# Patient Record
Sex: FEMALE | Race: WHITE | URBAN - METROPOLITAN AREA
[De-identification: names, ages, dates, MRNs, and addresses within clinical notes are randomized per-mention and may not be internally consistent; named-entity substitution may affect disease eponyms.]

---

## 2018-05-11 ENCOUNTER — HOSPITAL ENCOUNTER (INPATIENT)
Facility: CLINIC | Age: 68
LOS: 4 days | Discharge: NURSING, REHAB OR LONG-TERM CARE | DRG: 885 | End: 2018-05-16
Attending: EMERGENCY MEDICINE | Admitting: PSYCHIATRY & NEUROLOGY
Payer: COMMERCIAL

## 2018-05-11 ENCOUNTER — APPOINTMENT (OUTPATIENT)
Dept: GENERAL RADIOLOGY | Facility: CLINIC | Age: 68
DRG: 885 | End: 2018-05-11
Attending: EMERGENCY MEDICINE
Payer: COMMERCIAL

## 2018-05-11 DIAGNOSIS — R44.3 HALLUCINATIONS: ICD-10-CM

## 2018-05-11 DIAGNOSIS — F25.9 SCHIZOAFFECTIVE DISORDER, UNSPECIFIED TYPE (H): ICD-10-CM

## 2018-05-11 LAB
ANION GAP SERPL CALCULATED.3IONS-SCNC: 12 MMOL/L (ref 3–14)
BASOPHILS # BLD AUTO: 0.1 10E9/L (ref 0–0.2)
BASOPHILS NFR BLD AUTO: 0.6 %
BUN SERPL-MCNC: 21 MG/DL (ref 7–30)
CALCIUM SERPL-MCNC: 9.4 MG/DL (ref 8.5–10.1)
CHLORIDE SERPL-SCNC: 104 MMOL/L (ref 94–109)
CO2 SERPL-SCNC: 28 MMOL/L (ref 20–32)
CREAT SERPL-MCNC: 0.64 MG/DL (ref 0.52–1.04)
DEPRECATED S PYO AG THROAT QL EIA: NORMAL
DIFFERENTIAL METHOD BLD: NORMAL
EOSINOPHIL # BLD AUTO: 0.4 10E9/L (ref 0–0.7)
EOSINOPHIL NFR BLD AUTO: 4.7 %
ERYTHROCYTE [DISTWIDTH] IN BLOOD BY AUTOMATED COUNT: 14.3 % (ref 10–15)
GFR SERPL CREATININE-BSD FRML MDRD: >90 ML/MIN/1.7M2
GLUCOSE SERPL-MCNC: 92 MG/DL (ref 70–99)
HCT VFR BLD AUTO: 38.1 % (ref 35–47)
HGB BLD-MCNC: 12.8 G/DL (ref 11.7–15.7)
IMM GRANULOCYTES # BLD: 0 10E9/L (ref 0–0.4)
IMM GRANULOCYTES NFR BLD: 0.3 %
LYMPHOCYTES # BLD AUTO: 3.7 10E9/L (ref 0.8–5.3)
LYMPHOCYTES NFR BLD AUTO: 41.5 %
MCH RBC QN AUTO: 30.1 PG (ref 26.5–33)
MCHC RBC AUTO-ENTMCNC: 33.6 G/DL (ref 31.5–36.5)
MCV RBC AUTO: 90 FL (ref 78–100)
MONOCYTES # BLD AUTO: 0.7 10E9/L (ref 0–1.3)
MONOCYTES NFR BLD AUTO: 8.1 %
NEUTROPHILS # BLD AUTO: 4 10E9/L (ref 1.6–8.3)
NEUTROPHILS NFR BLD AUTO: 44.8 %
NRBC # BLD AUTO: 0 10*3/UL
NRBC BLD AUTO-RTO: 0 /100
PLATELET # BLD AUTO: 226 10E9/L (ref 150–450)
POTASSIUM SERPL-SCNC: 4.1 MMOL/L (ref 3.4–5.3)
RBC # BLD AUTO: 4.25 10E12/L (ref 3.8–5.2)
SODIUM SERPL-SCNC: 144 MMOL/L (ref 133–144)
SPECIMEN SOURCE: NORMAL
TSH SERPL DL<=0.005 MIU/L-ACNC: 1.37 MU/L (ref 0.4–4)
WBC # BLD AUTO: 8.9 10E9/L (ref 4–11)

## 2018-05-11 PROCEDURE — 87880 STREP A ASSAY W/OPTIC: CPT | Performed by: EMERGENCY MEDICINE

## 2018-05-11 PROCEDURE — 71046 X-RAY EXAM CHEST 2 VIEWS: CPT

## 2018-05-11 PROCEDURE — 80048 BASIC METABOLIC PNL TOTAL CA: CPT | Performed by: EMERGENCY MEDICINE

## 2018-05-11 PROCEDURE — 99285 EMERGENCY DEPT VISIT HI MDM: CPT | Mod: 25 | Performed by: EMERGENCY MEDICINE

## 2018-05-11 PROCEDURE — 90791 PSYCH DIAGNOSTIC EVALUATION: CPT

## 2018-05-11 PROCEDURE — 84443 ASSAY THYROID STIM HORMONE: CPT | Performed by: EMERGENCY MEDICINE

## 2018-05-11 PROCEDURE — 87081 CULTURE SCREEN ONLY: CPT | Performed by: EMERGENCY MEDICINE

## 2018-05-11 PROCEDURE — 85025 COMPLETE CBC W/AUTO DIFF WBC: CPT | Performed by: EMERGENCY MEDICINE

## 2018-05-11 PROCEDURE — 99285 EMERGENCY DEPT VISIT HI MDM: CPT | Mod: Z6 | Performed by: EMERGENCY MEDICINE

## 2018-05-11 RX ORDER — ALBUTEROL SULFATE 90 UG/1
2 AEROSOL, METERED RESPIRATORY (INHALATION) EVERY 6 HOURS PRN
COMMUNITY

## 2018-05-11 RX ORDER — ALENDRONATE SODIUM 70 MG/1
70 TABLET ORAL
COMMUNITY

## 2018-05-11 RX ORDER — TRAZODONE HYDROCHLORIDE 100 MG/1
100 TABLET ORAL AT BEDTIME
COMMUNITY

## 2018-05-11 RX ORDER — ZIPRASIDONE HYDROCHLORIDE 80 MG/1
80 CAPSULE ORAL EVERY EVENING
Status: ON HOLD | COMMUNITY
End: 2018-05-16

## 2018-05-11 RX ORDER — IPRATROPIUM BROMIDE AND ALBUTEROL SULFATE 2.5; .5 MG/3ML; MG/3ML
1 SOLUTION RESPIRATORY (INHALATION) 3 TIMES DAILY
COMMUNITY

## 2018-05-11 RX ORDER — AZELASTINE 1 MG/ML
1 SPRAY, METERED NASAL 2 TIMES DAILY
COMMUNITY

## 2018-05-11 RX ORDER — LORAZEPAM 0.5 MG/1
0.5 TABLET ORAL 2 TIMES DAILY
COMMUNITY

## 2018-05-11 RX ORDER — CLINDAMYCIN HCL 300 MG
600 CAPSULE ORAL DAILY
Status: ON HOLD | COMMUNITY
End: 2018-05-16

## 2018-05-11 RX ORDER — BUDESONIDE 0.5 MG/2ML
0.5 INHALANT ORAL 2 TIMES DAILY
COMMUNITY

## 2018-05-11 RX ORDER — ANTACID TABLETS 500 MG/1
1 TABLET, CHEWABLE ORAL 2 TIMES DAILY
COMMUNITY

## 2018-05-11 RX ORDER — ESCITALOPRAM OXALATE 20 MG/1
30 TABLET ORAL DAILY
COMMUNITY

## 2018-05-11 RX ORDER — ACETAMINOPHEN 325 MG/1
650 TABLET ORAL 3 TIMES DAILY
COMMUNITY

## 2018-05-11 RX ORDER — LEVOTHYROXINE SODIUM 150 UG/1
150 TABLET ORAL DAILY
COMMUNITY

## 2018-05-11 RX ORDER — ONDANSETRON 4 MG/1
4 TABLET, FILM COATED ORAL EVERY 8 HOURS PRN
COMMUNITY

## 2018-05-11 RX ORDER — FLUTICASONE PROPIONATE 50 MCG
1 SPRAY, SUSPENSION (ML) NASAL DAILY
COMMUNITY

## 2018-05-11 RX ORDER — SODIUM FLUORIDE 5 MG/G
GEL, DENTIFRICE DENTAL DAILY
COMMUNITY

## 2018-05-11 RX ORDER — ARIPIPRAZOLE 5 MG/1
5 TABLET ORAL DAILY
Status: ON HOLD | COMMUNITY
End: 2018-05-16

## 2018-05-11 RX ORDER — CETIRIZINE HYDROCHLORIDE 10 MG/1
10 TABLET ORAL DAILY
COMMUNITY

## 2018-05-11 RX ORDER — ZIPRASIDONE HYDROCHLORIDE 60 MG/1
60 CAPSULE ORAL 2 TIMES DAILY WITH MEALS
COMMUNITY

## 2018-05-11 RX ORDER — AMOXICILLIN 250 MG
1 CAPSULE ORAL DAILY
Status: ON HOLD | COMMUNITY
End: 2018-05-16

## 2018-05-11 ASSESSMENT — ENCOUNTER SYMPTOMS
COUGH: 1
CHILLS: 0
SORE THROAT: 1
FEVER: 0

## 2018-05-11 ASSESSMENT — ACTIVITIES OF DAILY LIVING (ADL)
GROOMING: WITH ASSISTANCE
ORAL_HYGIENE: WITH ASSISTANCE
DRESS: WITH ASSISTANCE

## 2018-05-11 NOTE — IP AVS SNAPSHOT
STOP!!! DO NOT PRINT OR REFERENCE THIS AVS!!!  AVS displayed here is NOT the version that was given to the patient at discharge.  GO TO CHART REVIEW to print or review a copy of the AVS that was frozen/printed at time of discharge.                           MRN:[id number]                      After Visit Summary   5/11/2018    Ekaterina Waldrop    MRN: [id number]           Thank you!     Thank you for choosing [facility] for your care. Our goal is always to provide you with excellent care.        Patient Information     Date Of Birth          1950        Designated Caregiver       Most Recent Value    Caregiver    Will someone help with your care after discharge? yes    Name of designated caregiver Assited living    Phone number of caregiver unk    Caregiver address unk      About your hospital stay     You were admitted on:  May 12, 2018 You last received care in the:  [address]    You were discharged on:  May 16, 2018       Who to Call     For medical emergencies, please call 911.  For non-urgent questions about your medical care, please call your primary care provider or clinic, None          Attending Provider     Provider Specialty    Kathie Banerjee MD Emergency Medicine    Safia Crouch MD Psychiatry    [facility], Ronnie Garrett MD Psychiatry       Primary Care Provider Fax #    Physician No Ref-Primary [phone]      Further instructions from your care team        Behavioral Discharge Planning and Instructions      Summary:  You were admitted on 5/11/2018  due to Schizoaffective disorder.  You were treated by Dr. Ronnie Cristina MD and discharged on 05/16/2018 from [address] to [place] [address]  [phone]      Principal Diagnosis:   Schizoaffective Disorder    Health Care Follow-up Appointments:   Psychiatry:   Dr. Mark Watkins MD, -  Tuesday, May 29th at 3:20 pm  [facility], [place],   [phone]    PCP:   [address] [place] [address]  [place]  [address],   [phone], Fax: [phone]    :    Jewels Pennington,   [phone],   Fax: [phone]    Guardian:  Marie Roberson  [facility]   ([phone])    Attend all scheduled appointments with your outpatient providers. Call at least 24 hours in advance if you need to reschedule an appointment to ensure continued access to your outpatient providers.   Major Treatments, Procedures and Findings:  You were provided with: a psychiatric assessment, assessed for medical stability and medication evaluation and/or management    Symptoms to Report: feeling more aggressive, increased confusion, losing more sleep, mood getting worse or thoughts of suicide    Early warning signs can include: increased depression or anxiety sleep disturbances increased thoughts or behaviors of suicide or self-harm  increased unusual thinking, such as paranoia or hearing voices    Safety and Wellness:  Take all medicines as directed.  Make no changes unless your doctor suggests them.      Follow treatment recommendations.  Refrain from alcohol and non-prescribed drugs.  If there is a concern for safety, call 911.    Resources:   Crisis Intervention: 760.289.9985 or 955-069-7830 (TTY: 683.607.9455).  Call anytime for help.  [employer] [place] [employer]ness (www.[place].eleonora.org): 457.766.2648 or 293-840-2429.  Suicide Awareness Voices of Education (SAVE) (www.save.org): 707-880-SAVE (0989)  Mental Health Consumer/Survivor Network of [place] (www.mhcsn.net): 796.594.7118 or 255-818-9092  Mental Health Association of [place] (www.mentalhealth.org): 101.835.1519 or 086-626-8063  Self- Management and Recovery Training., SMART-- Toll free: [phone]  www.[place].org  [facility] Crisis (COPE) Response - Adult [phone]    The treatment team has appreciated the opportunity to work with you.     If you have any questions or concerns our unit number is [phone].        Pending  "Results     No orders found from 2018 to 2018.            Statement of Approval     Ordered          18 0856  I have reviewed and agree with all the recommendations and orders detailed in this document.  EFFECTIVE NOW     Approved and electronically signed by:  Ronnie Kaba MD             Admission Information     Date & Time Department Dept. Phone    2018 [facility] -[phone]      Your Vitals Were     Blood Pressure Pulse Temperature Respirations Height Weight    96/48 96 98.3  F (36.8  C) (Tympanic) 16 1.511 m (4' 11.5\") 73.1 kg (161 lb 1.6 oz)    Pulse Oximetry BMI (Body Mass Index)                93% 31.99 kg/m2          MyChart Information     [facility] lets you send messages to your doctor, view your test results, renew your prescriptions, schedule appointments and more. To sign up, go to [email or web address] . Click on \"Log in\" on the left side of the screen, which will take you to the Welcome page. Then click on \"Sign up Now\" on the right side of the page.     You will be asked to enter the access code listed below, as well as some personal information. Please follow the directions to create your username and password.     Your access code is: [email or web address]  Expires: 2018  8:31 AM     Your access code will  in 90 days. If you need help or a new code, please call your [place] clinic or [phone].        Care EveryWhere ID     This is your Care EveryWhere ID. This could be used by other organizations to access your [place] medical records  [id number]        Equal Access to Services     [facility] : [employer], waaxda luqcaryn, qaybdieter catryallora almaraz. So [facility] [phone].    ATENCIÓN: Si habla español, tiene a rodriguez disposición servicios gratuitos de asistencia lingüística. Llame al [phone].    We comply with applicable federal civil rights laws and [place] laws. We do not discriminate on " the basis of race, color, national origin, age, disability, sex, sexual orientation, or gender identity.               Review of your medicines      CONTINUE these medicines which may have CHANGED, or have new prescriptions. If we are uncertain of the size of tablets/capsules you have at home, strength may be listed as something that might have changed.        Dose / Directions    ARIPiprazole 10 MG tablet   Commonly known as:  ABILIFY   This may have changed:    - medication strength  - how much to take   Used for:  Schizoaffective disorder, unspecified type (H)        Dose:  10 mg   Take 1 tablet (10 mg) by mouth daily   Quantity:  30 tablet   Refills:  1       senna-docusate 8.6-50 MG per tablet   Commonly known as:  SENOKOT-S;PERICOLACE   This may have changed:  when to take this        Dose:  1 tablet   Take 1 tablet by mouth 2 times daily   Quantity:  100 tablet   Refills:  0       * ziprasidone 60 MG capsule   Commonly known as:  GEODON   This may have changed:  Another medication with the same name was changed. Make sure you understand how and when to take each.        Dose:  60 mg   Take 60 mg by mouth 2 times daily (with meals)   Refills:  0       * ziprasidone 40 MG capsule   Commonly known as:  GEODON   This may have changed:    - medication strength  - how much to take   Used for:  Schizoaffective disorder, unspecified type (H)        Dose:  40 mg   Take 1 capsule (40 mg) by mouth every evening   Quantity:  30 capsule   Refills:  1       * Notice:  This list has 2 medication(s) that are the same as other medications prescribed for you. Read the directions carefully, and ask your doctor or other care provider to review them with you.      CONTINUE these medicines which have NOT CHANGED        Dose / Directions    acetaminophen 325 MG tablet   Commonly known as:  TYLENOL        Dose:  650 mg   Take 650 mg by mouth 3 times daily and once daily as needed   Refills:  0       albuterol 108 (90 Base) MCG/ACT  Inhaler   Commonly known as:  PROAIR HFA/PROVENTIL HFA/VENTOLIN HFA        Dose:  2 puff   Inhale 2 puffs into the lungs every 6 hours as needed for shortness of breath / dyspnea or wheezing   Refills:  0       alendronate 70 MG tablet   Commonly known as:  FOSAMAX        Dose:  70 mg   Take 70 mg by mouth every 7 days   Refills:  0       azelastine 0.1 % spray   Commonly known as:  ASTELIN        Dose:  1 spray   Spray 1 spray into both nostrils 2 times daily   Refills:  0       bismuth subsalicylate 525 MG/15ML Susp        Dose:  15 mL   Take 15 mLs by mouth 3 times daily as needed   Refills:  0       budesonide 0.5 MG/2ML neb solution   Commonly known as:  PULMICORT        Dose:  0.5 mg   Take 0.5 mg by nebulization 2 times daily   Refills:  0       calcium 600 + D 600-400 MG-UNIT per tablet   Generic drug:  calcium-vitamin D        Dose:  1 tablet   Take 1 tablet by mouth 3 times daily   Refills:  0       calcium carbonate 500 MG Chew        Dose:  1 tablet   Take 1 tablet by mouth 2 times daily   Refills:  0       cetirizine 10 MG tablet   Commonly known as:  zyrTEC        Dose:  10 mg   Take 10 mg by mouth daily   Refills:  0       cholecalciferol 1000 units Tabs        Dose:  1000 Units   Take 1,000 Units by mouth daily   Refills:  0       escitalopram 20 MG tablet   Commonly known as:  LEXAPRO        Dose:  30 mg   Take 30 mg by mouth daily   Refills:  0       fluticasone 50 MCG/ACT spray   Commonly known as:  FLONASE        Dose:  1 spray   Spray 1 spray into both nostrils daily   Refills:  0       hypromellose-dextran 0.1-0.3 % Soln ophthalmic solution   Commonly known as:  ARTIFICAL TEARS        Dose:  1 drop   Place 1 drop into both eyes 2 times daily   Refills:  0       ipratropium - albuterol 0.5 mg/2.5 mg/3 mL 0.5-2.5 (3) MG/3ML neb solution   Commonly known as:  DUONEB        Dose:  1 vial   Take 1 vial by nebulization 3 times daily   Refills:  0       levothyroxine 150 MCG tablet   Commonly known  as:  SYNTHROID/LEVOTHROID        Dose:  150 mcg   Take 150 mcg by mouth daily   Refills:  0       LORazepam 0.5 MG tablet   Commonly known as:  ATIVAN        Dose:  0.5 mg   Take 0.5 mg by mouth 2 times daily   Refills:  0       MULTIVITAMIN ADULT PO        Dose:  1 tablet   Take 1 tablet by mouth daily   Refills:  0       ondansetron 4 MG tablet   Commonly known as:  ZOFRAN        Dose:  4 mg   Take 4 mg by mouth every 8 hours as needed for nausea   Refills:  0       POLYETHYLENE GLYCOL 3350 PO        Dose:  1 packet   Take 1 packet by mouth daily   Refills:  0       sodium fluoride dental gel 1.1 % Gel topical gel   Commonly known as:  PREVIDENT        Apply to affected area daily   Refills:  0       traZODone 100 MG tablet   Commonly known as:  DESYREL        Dose:  100 mg   Take 100 mg by mouth At Bedtime   Refills:  0         STOP taking     clindamycin 300 MG capsule   Commonly known as:  CLEOCIN                Where to get your medicines      These medications were sent to [facility] - [place], [place] - [address]  [address], [facility] [address]     Phone:  [phone]     ARIPiprazole 10 MG tablet    ziprasidone 40 MG capsule                Protect others around you: Learn how to safely use, store and throw away your medicines at www.disposemymeds.org.             Medication List: This is a list of all your medications and when to take them. Check marks below indicate your daily home schedule. Keep this list as a reference.      Medications           Morning Afternoon Evening Bedtime As Needed    acetaminophen 325 MG tablet   Commonly known as:  TYLENOL   Take 650 mg by mouth 3 times daily and once daily as needed                                albuterol 108 (90 Base) MCG/ACT Inhaler   Commonly known as:  PROAIR HFA/PROVENTIL HFA/VENTOLIN HFA   Inhale 2 puffs into the lungs every 6 hours as needed for shortness of breath / dyspnea or wheezing                                 alendronate 70 MG tablet   Commonly known as:  FOSAMAX   Take 70 mg by mouth every 7 days                                ARIPiprazole 10 MG tablet   Commonly known as:  ABILIFY   Take 1 tablet (10 mg) by mouth daily   Last time this was given:  10 mg on 5/16/2018  8:28 AM                                azelastine 0.1 % spray   Commonly known as:  ASTELIN   Spray 1 spray into both nostrils 2 times daily   Last time this was given:  1 spray on 5/16/2018  8:40 AM                                bismuth subsalicylate 525 MG/15ML Susp   Take 15 mLs by mouth 3 times daily as needed                                budesonide 0.5 MG/2ML neb solution   Commonly known as:  PULMICORT   Take 0.5 mg by nebulization 2 times daily   Last time this was given:  0.5 mg on 5/16/2018  9:24 AM                                calcium 600 + D 600-400 MG-UNIT per tablet   Take 1 tablet by mouth 3 times daily   Generic drug:  calcium-vitamin D                                calcium carbonate 500 MG Chew   Take 1 tablet by mouth 2 times daily                                cetirizine 10 MG tablet   Commonly known as:  zyrTEC   Take 10 mg by mouth daily   Last time this was given:  10 mg on 5/16/2018  8:35 AM                                cholecalciferol 1000 units Tabs   Take 1,000 Units by mouth daily                                escitalopram 20 MG tablet   Commonly known as:  LEXAPRO   Take 30 mg by mouth daily   Last time this was given:  30 mg on 5/16/2018  8:35 AM                                fluticasone 50 MCG/ACT spray   Commonly known as:  FLONASE   Spray 1 spray into both nostrils daily   Last time this was given:  1 spray on 5/16/2018  8:39 AM                                hypromellose-dextran 0.1-0.3 % Soln ophthalmic solution   Commonly known as:  ARTIFICAL TEARS   Place 1 drop into both eyes 2 times daily   Last time this was given:  2 drops on 5/16/2018  8:41 AM                                ipratropium -  albuterol 0.5 mg/2.5 mg/3 mL 0.5-2.5 (3) MG/3ML neb solution   Commonly known as:  DUONEB   Take 1 vial by nebulization 3 times daily   Last time this was given:  3 mLs on 5/16/2018  9:25 AM                                levothyroxine 150 MCG tablet   Commonly known as:  SYNTHROID/LEVOTHROID   Take 150 mcg by mouth daily   Last time this was given:  150 mcg on 5/16/2018  8:34 AM                                LORazepam 0.5 MG tablet   Commonly known as:  ATIVAN   Take 0.5 mg by mouth 2 times daily   Last time this was given:  0.5 mg on 5/16/2018  8:35 AM                                MULTIVITAMIN ADULT PO   Take 1 tablet by mouth daily                                ondansetron 4 MG tablet   Commonly known as:  ZOFRAN   Take 4 mg by mouth every 8 hours as needed for nausea                                POLYETHYLENE GLYCOL 3350 PO   Take 1 packet by mouth daily   Last time this was given:  17 g on 5/16/2018  8:36 AM                                senna-docusate 8.6-50 MG per tablet   Commonly known as:  SENOKOT-S;PERICOLACE   Take 1 tablet by mouth 2 times daily   Last time this was given:  1 tablet on 5/16/2018  8:35 AM                                sodium fluoride dental gel 1.1 % Gel topical gel   Commonly known as:  PREVIDENT   Apply to affected area daily                                traZODone 100 MG tablet   Commonly known as:  DESYREL   Take 100 mg by mouth At Bedtime   Last time this was given:  100 mg on 5/15/2018  9:00 PM                                * ziprasidone 60 MG capsule   Commonly known as:  GEODON   Take 60 mg by mouth 2 times daily (with meals)   Last time this was given:  60 mg on 5/16/2018  8:35 AM                                * ziprasidone 40 MG capsule   Commonly known as:  GEODON   Take 1 capsule (40 mg) by mouth every evening   Last time this was given:  60 mg on 5/16/2018  8:35 AM                                * Notice:  This list has 2 medication(s) that are the same as other  medications prescribed for you. Read the directions carefully, and ask your doctor or other care provider to review them with you.

## 2018-05-11 NOTE — IP AVS SNAPSHOT
[facility]    [address]    [place] [address]    Phone:  [phone]                                       After Visit Summary   5/11/2018    Ekaterina Waldrop    MRN: [id number]           After Visit Summary Signature Page     I have received my discharge instructions, and my questions have been answered. I have discussed any challenges I see with this plan with the nurse or doctor.    ..........................................................................................................................................  Patient/Patient Representative Signature      ..........................................................................................................................................  Patient Representative Print Name and Relationship to Patient    ..................................................               ................................................  Date                                            Time    ..........................................................................................................................................  Reviewed by Signature/Title    ...................................................              ..............................................  Date                                                            Time

## 2018-05-12 PROBLEM — R44.3 HALLUCINATIONS: Status: ACTIVE | Noted: 2018-05-12

## 2018-05-12 PROCEDURE — 40000275 ZZH STATISTIC RCP TIME EA 10 MIN

## 2018-05-12 PROCEDURE — 99221 1ST HOSP IP/OBS SF/LOW 40: CPT | Mod: AI | Performed by: PSYCHIATRY & NEUROLOGY

## 2018-05-12 PROCEDURE — 25000132 ZZH RX MED GY IP 250 OP 250 PS 637: Performed by: PSYCHIATRY & NEUROLOGY

## 2018-05-12 PROCEDURE — 25000132 ZZH RX MED GY IP 250 OP 250 PS 637: Performed by: EMERGENCY MEDICINE

## 2018-05-12 PROCEDURE — 12400007 ZZH R&B MH INTERMEDIATE UMMC

## 2018-05-12 PROCEDURE — 93005 ELECTROCARDIOGRAM TRACING: CPT

## 2018-05-12 PROCEDURE — 25000125 ZZHC RX 250: Performed by: EMERGENCY MEDICINE

## 2018-05-12 PROCEDURE — 94640 AIRWAY INHALATION TREATMENT: CPT | Mod: 76

## 2018-05-12 PROCEDURE — 94640 AIRWAY INHALATION TREATMENT: CPT

## 2018-05-12 RX ORDER — HYDROXYZINE HYDROCHLORIDE 25 MG/1
25 TABLET, FILM COATED ORAL EVERY 4 HOURS PRN
Status: DISCONTINUED | OUTPATIENT
Start: 2018-05-12 | End: 2018-05-16 | Stop reason: HOSPADM

## 2018-05-12 RX ORDER — ARIPIPRAZOLE 5 MG/1
5 TABLET ORAL DAILY
Status: DISCONTINUED | OUTPATIENT
Start: 2018-05-12 | End: 2018-05-12

## 2018-05-12 RX ORDER — CALCIUM CARBONATE 500 MG/1
1 TABLET, CHEWABLE ORAL 2 TIMES DAILY
Status: DISCONTINUED | OUTPATIENT
Start: 2018-05-12 | End: 2018-05-16 | Stop reason: HOSPADM

## 2018-05-12 RX ORDER — LEVOTHYROXINE SODIUM 75 UG/1
150 TABLET ORAL DAILY
Status: DISCONTINUED | OUTPATIENT
Start: 2018-05-12 | End: 2018-05-16 | Stop reason: HOSPADM

## 2018-05-12 RX ORDER — IPRATROPIUM BROMIDE AND ALBUTEROL SULFATE 2.5; .5 MG/3ML; MG/3ML
1 SOLUTION RESPIRATORY (INHALATION) 3 TIMES DAILY
Status: DISCONTINUED | OUTPATIENT
Start: 2018-05-12 | End: 2018-05-16 | Stop reason: HOSPADM

## 2018-05-12 RX ORDER — ZIPRASIDONE HYDROCHLORIDE 40 MG/1
40 CAPSULE ORAL EVERY EVENING
Status: DISCONTINUED | OUTPATIENT
Start: 2018-05-12 | End: 2018-05-16 | Stop reason: HOSPADM

## 2018-05-12 RX ORDER — BUDESONIDE 0.5 MG/2ML
0.5 INHALANT ORAL 2 TIMES DAILY
Status: DISCONTINUED | OUTPATIENT
Start: 2018-05-12 | End: 2018-05-16 | Stop reason: HOSPADM

## 2018-05-12 RX ORDER — ZIPRASIDONE HYDROCHLORIDE 40 MG/1
80 CAPSULE ORAL EVERY EVENING
Status: DISCONTINUED | OUTPATIENT
Start: 2018-05-12 | End: 2018-05-12

## 2018-05-12 RX ORDER — FLUTICASONE PROPIONATE 50 MCG
1 SPRAY, SUSPENSION (ML) NASAL DAILY
Status: DISCONTINUED | OUTPATIENT
Start: 2018-05-12 | End: 2018-05-16 | Stop reason: HOSPADM

## 2018-05-12 RX ORDER — TRAZODONE HYDROCHLORIDE 100 MG/1
100 TABLET ORAL AT BEDTIME
Status: DISCONTINUED | OUTPATIENT
Start: 2018-05-12 | End: 2018-05-16 | Stop reason: HOSPADM

## 2018-05-12 RX ORDER — LORAZEPAM 0.5 MG/1
0.5 TABLET ORAL 2 TIMES DAILY
Status: DISCONTINUED | OUTPATIENT
Start: 2018-05-12 | End: 2018-05-16 | Stop reason: HOSPADM

## 2018-05-12 RX ORDER — ZIPRASIDONE HYDROCHLORIDE 60 MG/1
60 CAPSULE ORAL 2 TIMES DAILY WITH MEALS
Status: DISCONTINUED | OUTPATIENT
Start: 2018-05-12 | End: 2018-05-16 | Stop reason: HOSPADM

## 2018-05-12 RX ORDER — ALBUTEROL SULFATE 90 UG/1
2 AEROSOL, METERED RESPIRATORY (INHALATION) EVERY 6 HOURS PRN
Status: DISCONTINUED | OUTPATIENT
Start: 2018-05-12 | End: 2018-05-16 | Stop reason: HOSPADM

## 2018-05-12 RX ORDER — AZELASTINE 1 MG/ML
1 SPRAY, METERED NASAL 2 TIMES DAILY
Status: DISCONTINUED | OUTPATIENT
Start: 2018-05-12 | End: 2018-05-16 | Stop reason: HOSPADM

## 2018-05-12 RX ORDER — ZIPRASIDONE HYDROCHLORIDE 60 MG/1
60 CAPSULE ORAL EVERY EVENING
Status: DISCONTINUED | OUTPATIENT
Start: 2018-05-12 | End: 2018-05-12

## 2018-05-12 RX ORDER — ONDANSETRON 4 MG/1
4 TABLET, FILM COATED ORAL EVERY 8 HOURS PRN
Status: DISCONTINUED | OUTPATIENT
Start: 2018-05-12 | End: 2018-05-16 | Stop reason: HOSPADM

## 2018-05-12 RX ORDER — ARIPIPRAZOLE 5 MG/1
10 TABLET ORAL DAILY
Status: DISCONTINUED | OUTPATIENT
Start: 2018-05-13 | End: 2018-05-16 | Stop reason: HOSPADM

## 2018-05-12 RX ADMIN — ZIPRASIDONE HYDROCHLORIDE 80 MG: 40 CAPSULE ORAL at 01:14

## 2018-05-12 RX ADMIN — ARIPIPRAZOLE 5 MG: 5 TABLET ORAL at 09:12

## 2018-05-12 RX ADMIN — LEVOTHYROXINE SODIUM 150 MCG: 75 TABLET ORAL at 09:12

## 2018-05-12 RX ADMIN — DEXTRAN 70 AND HYPROMELLOSE 2910 1 DROP: 1; 3 SOLUTION/ DROPS OPHTHALMIC at 21:04

## 2018-05-12 RX ADMIN — FLUTICASONE PROPIONATE 1 SPRAY: 50 SPRAY, METERED NASAL at 09:16

## 2018-05-12 RX ADMIN — LORAZEPAM 0.5 MG: 0.5 TABLET ORAL at 09:12

## 2018-05-12 RX ADMIN — CALCIUM CARBONATE (ANTACID) CHEW TAB 500 MG 500 MG: 500 CHEW TAB at 21:03

## 2018-05-12 RX ADMIN — ZIPRASIDONE HCL 60 MG: 60 CAPSULE ORAL at 09:12

## 2018-05-12 RX ADMIN — IPRATROPIUM BROMIDE AND ALBUTEROL SULFATE 3 ML: .5; 3 SOLUTION RESPIRATORY (INHALATION) at 09:41

## 2018-05-12 RX ADMIN — ESCITALOPRAM OXALATE 30 MG: 20 TABLET ORAL at 09:12

## 2018-05-12 RX ADMIN — IPRATROPIUM BROMIDE AND ALBUTEROL SULFATE 3 ML: .5; 3 SOLUTION RESPIRATORY (INHALATION) at 14:41

## 2018-05-12 RX ADMIN — ZIPRASIDONE HCL 40 MG: 40 CAPSULE ORAL at 21:03

## 2018-05-12 RX ADMIN — ZIPRASIDONE HCL 60 MG: 60 CAPSULE ORAL at 19:20

## 2018-05-12 RX ADMIN — AZELASTINE HYDROCHLORIDE 1 SPRAY: 137 SPRAY, METERED NASAL at 09:15

## 2018-05-12 RX ADMIN — LORAZEPAM 0.5 MG: 0.5 TABLET ORAL at 21:03

## 2018-05-12 RX ADMIN — CALCIUM CARBONATE (ANTACID) CHEW TAB 500 MG 500 MG: 500 CHEW TAB at 09:12

## 2018-05-12 RX ADMIN — AZELASTINE HYDROCHLORIDE 1 SPRAY: 137 SPRAY, METERED NASAL at 21:04

## 2018-05-12 RX ADMIN — BUDESONIDE 0.5 MG: 0.5 INHALANT RESPIRATORY (INHALATION) at 20:48

## 2018-05-12 RX ADMIN — TRAZODONE HYDROCHLORIDE 100 MG: 100 TABLET ORAL at 01:14

## 2018-05-12 RX ADMIN — TRAZODONE HYDROCHLORIDE 100 MG: 100 TABLET ORAL at 21:03

## 2018-05-12 RX ADMIN — BUDESONIDE 0.5 MG: 0.5 INHALANT RESPIRATORY (INHALATION) at 09:40

## 2018-05-12 RX ADMIN — IPRATROPIUM BROMIDE AND ALBUTEROL SULFATE 3 ML: .5; 3 SOLUTION RESPIRATORY (INHALATION) at 20:48

## 2018-05-12 RX ADMIN — DEXTRAN 70 AND HYPROMELLOSE 2910 1 DROP: 1; 3 SOLUTION/ DROPS OPHTHALMIC at 09:17

## 2018-05-12 ASSESSMENT — ACTIVITIES OF DAILY LIVING (ADL)
LAUNDRY: UNABLE TO COMPLETE
ORAL_HYGIENE: WITH ASSISTANCE
DRESS: SCRUBS (BEHAVIORAL HEALTH);WITH ASSISTANCE
GROOMING: WITH ASSISTANCE

## 2018-05-12 NOTE — PROGRESS NOTES
05/11/18 2247   Patient Belongings   Did you bring any home meds/supplements to the hospital?  No   Patient Belongings Clothing;shoes;watch   Disposition of Belongings Kept with patient;Locker   Belongings Search Yes   Clothing Search Yes   Second Staff Hanna PÉREZ RN, Mechelle KIM RN   A               Admission:  I am responsible for any personal items that are not sent to the safe or pharmacy.  [facility] is not responsible for loss, theft or damage of any property in my possession.  With pt: pants, shirt, shoes, walker  Locker: sweat shirt, rings, watch  Signature:  _________________________________ Date: _______  Time: _____                                              Staff Signature:  ____________________________ Date: ________  Time: _____      2nd Staff person, if patient is unable/unwilling to sign:    Signature: ________________________________ Date: ________  Time: _____     Discharge:  [facility] has returned all of my personal belongings:    Signature: _________________________________ Date: ________  Time: _____                                          Staff Signature:  ____________________________ Date: ________  Time: _____

## 2018-05-12 NOTE — PHARMACY-ADMISSION MEDICATION HISTORY
"Admission medication history interview status for the 5/11/2018 admission is complete. See Epic admission navigator for allergy information, pharmacy, prior to admission medications and immunization status.     Medication history interview sources:  [facility] Medication List, [facility] Medication List, [facility] Keller nurse    Changes made to PTA medication list (reason)  Added: All medications listed in table below were added  Deleted: None  Changed: None    Additional medication history information (including reliability of information, actions taken by pharmacist):    Received two medication lists from the [facility] and [facility] the patient has [facility]. The lists were not identical. When [facility] the nurse stated the list from them was what the patient had been receiving. The medications added were based off of the [facility] list. The medication list also did not indicate if the patient had received her medications today. When asked, the nurse said she \"most likely did\" but she couldn't be sure.    The [facility] list had a handwritten note indicating an increase to the aripiprazole dose from 5mg to 20mg, however the [facility] nurse stated the patient has been receiving the 5mg dose. Will need to contact [facility] to determine appropriate dose.      Prior to Admission medications    Medication Sig Last Dose Taking? Auth Provider   acetaminophen (TYLENOL) 325 MG tablet Take 650 mg by mouth 3 times daily and once daily as needed 5/11/2018 at Noon Yes Unknown, Entered By History   albuterol (PROAIR HFA/PROVENTIL HFA/VENTOLIN HFA) 108 (90 Base) MCG/ACT Inhaler Inhale 2 puffs into the lungs every 6 hours as needed for shortness of breath / dyspnea or wheezing Unknown Yes Unknown, Entered By History   alendronate (FOSAMAX) 70 MG tablet Take 70 mg by mouth every 7 days 5/11/2018 Yes Unknown, Entered By History "   ARIPiprazole (ABILIFY) 5 MG tablet Take 5 mg by mouth daily 5/11/2018 at AM Yes Unknown, Entered By History   azelastine (ASTELIN) 0.1 % spray Spray 1 spray into both nostrils 2 times daily 5/11/2018 at AM Yes Unknown, Entered By History   bismuth subsalicylate 525 MG/15ML SUSP Take 15 mLs by mouth 3 times daily as needed Unknown Yes Unknown, Entered By History   budesonide (PULMICORT) 0.5 MG/2ML neb solution Take 0.5 mg by nebulization 2 times daily 5/11/2018 at AM Yes Unknown, Entered By History   calcium carbonate 500 MG CHEW Take 1 tablet by mouth 2 times daily 5/11/2018 at AM Yes Unknown, Entered By History   calcium-vitamin D (CALCIUM 600 + D) 600-400 MG-UNIT per tablet Take 1 tablet by mouth 3 times daily 5/11/2018 at AM Yes Unknown, Entered By History   cetirizine (ZYRTEC) 10 MG tablet Take 10 mg by mouth daily 5/11/2018 at AM Yes Unknown, Entered By History   cholecalciferol 1000 units TABS Take 1,000 Units by mouth daily 5/11/2018 at AM Yes Unknown, Entered By History   clindamycin (CLEOCIN) 300 MG capsule Take 600 mg by mouth daily 5/11/2018 at AM Yes Unknown, Entered By History   escitalopram (LEXAPRO) 20 MG tablet Take 30 mg by mouth daily 5/11/2018 at AM Yes Unknown, Entered By History   fluticasone (FLONASE) 50 MCG/ACT spray Spray 1 spray into both nostrils daily 5/11/2018 at AM Yes Unknown, Entered By History   hypromellose-dextran (ARTIFICAL TEARS) 0.1-0.3 % SOLN ophthalmic solution Place 1 drop into both eyes 2 times daily 5/11/2018 at AM Yes Unknown, Entered By History   ipratropium - albuterol 0.5 mg/2.5 mg/3 mL (DUONEB) 0.5-2.5 (3) MG/3ML neb solution Take 1 vial by nebulization 3 times daily 5/11/2018 at AM Yes Unknown, Entered By History   levothyroxine (SYNTHROID/LEVOTHROID) 150 MCG tablet Take 150 mcg by mouth daily 5/11/2018 at AM Yes Unknown, Entered By History   LORazepam (ATIVAN) 0.5 MG tablet Take 0.5 mg by mouth 2 times daily 5/11/2018 at AM Yes Unknown, Entered By History   Multiple  Vitamins-Minerals (MULTIVITAMIN ADULT PO) Take 1 tablet by mouth daily 5/11/2018 at AM Yes Unknown, Entered By History   ondansetron (ZOFRAN) 4 MG tablet Take 4 mg by mouth every 8 hours as needed for nausea Unknown Yes Unknown, Entered By History   POLYETHYLENE GLYCOL 3350 PO Take 1 packet by mouth daily 5/11/2018 at AM Yes Unknown, Entered By History   senna-docusate (SENOKOT-S;PERICOLACE) 8.6-50 MG per tablet Take 1 tablet by mouth daily 5/11/2018 at AM Yes Unknown, Entered By History   sodium fluoride dental gel (PREVIDENT) 1.1 % GEL topical gel Apply to affected area daily 5/11/2018 at AM Yes Unknown, Entered By History   traZODone (DESYREL) 100 MG tablet Take 100 mg by mouth At Bedtime 5/10/2018 at PM Yes Unknown, Entered By History   ziprasidone (GEODON) 60 MG capsule Take 60 mg by mouth 2 times daily (with meals) 5/11/2018 at AM Yes Unknown, Entered By History   ziprasidone (GEODON) 80 MG capsule Take 80 mg by mouth every evening 5/10/2018 at PM Yes Unknown, Entered By History         Medication history completed by: Percy Huynh, Pharmacy Intern
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
## 2018-05-12 NOTE — ED NOTES
"ED to Behavioral Floor Handoff    SITUATION  Ekaterina Waldrop is a 68 year old female who speaks English and lives in an assisted living with others The patient arrived in the ED by ambulance from Assisted living with a complaint of Pharyngitis (onset last week--reports soreness \"is way down here\" pointing to just above the notch superior to sternum; has not been seen for this; reports difficulty swallowing; reports chills; ) and Otalgia (reports both hears hurt; onset the same time as her sore throat; denies ear drainage and then stated \"I don't know\"; )  .The patient's current symptoms started/worsened 3 day(s) ago and during this time the symptoms have increased.   In the ED, pt was diagnosed with   Final diagnoses:   Schizoaffective disorder, unspecified type (H)   Hallucinations        Initial vitals were: BP: 119/44  Pulse: 74  Temp: 99.2  F (37.3  C)  Resp: 20  Height: 151.1 cm (4' 11.5\")  SpO2: 94 %   --------  Is the patient diabetic? No   If yes, last blood glucose? --     If yes, was this treated in the ED? --  --------  Is the patient inebriated (ETOH) No or Impaired on other substances? No  MSSA done? N/A  Last MSSA score: --    Were withdrawal symptoms treated? N/A  Does the patient have a seizure history? No. If yes, date of most recent seizure--  --------  Is the patient patient experiencing suicidal ideation? denies current or recent suicidal ideation     Homicidal ideation? denies current or recent homicidal ideation or behaviors.    Self-injurious behavior/urges? denies current or recent self injurious behavior or ideation.  ------  Was pt aggressive in the ED No  Was a code called No  Is the pt now cooperative? Yes  -------  Meds given in ED: Medications - No data to display   Family present during ED course? No  Family currently present? No    BACKGROUND  Does the patient have a cognitive impairment or developmental disability? Yes  Allergies:   Allergies   Allergen Reactions     Aspirin      " "nausea     Erythromycin      nausea   .   Social demographics are   Social History     Social History     Marital status: Single     Spouse name: N/A     Number of children: N/A     Years of education: N/A     Social History Main Topics     Smoking status: Not on file     Smokeless tobacco: Not on file     Alcohol use Not on file     Drug use: Not on file     Sexual activity: Not on file     Other Topics Concern     Not on file     Social History Narrative        ASSESSMENT  Labs results   Labs Ordered and Resulted from Time of ED Arrival Up to the Time of Departure from the ED   CBC WITH PLATELETS DIFFERENTIAL   BASIC METABOLIC PANEL   TSH WITH FREE T4 REFLEX   URINE MACROSCOPIC WITH REFLEX TO MICRO   RAPID STREP SCREEN   BETA STREP GROUP A CULTURE      Imaging Studies:   Recent Results (from the past 24 hour(s))   XR Chest 2 Views    Narrative    XR CHEST 2 VW 5/11/2018 6:53 PM     HISTORY: cough;       Impression    IMPRESSION: The lungs appear clear. No pleural effusions. Old rib  fractures are noted.    SHANIQUA MORAES MD      Most recent vital signs /44  Pulse 74  Temp 99.2  F (37.3  C) (Oral)  Resp 20  Ht 1.511 m (4' 11.5\")  SpO2 94%   Abnormal labs/tests/findings requiring intervention:---   Pain control: pt had none  Nausea control: pt had none    RECOMMENDATION  Are any infection precautions needed (MRSA, VRE, etc.)? No If yes, what infection? --  ---  Does the patient have mobility issues? Uses a rolling a walker. If yes, what device does the pt use? ---  ---  Is patient on 72 hour hold or commitment? Yes If on 72 hour hold, have hold and rights been given to patient? Yes  Are admitting orders written if after 10 p.m. ?Yes  Tasks needing to be completed:---     Zain Moore   [facility]-- [phone] [facility] ED   [phone] East ED  "

## 2018-05-12 NOTE — PROGRESS NOTES
"Pt admitted from ER after going there complaining of ear pain and cough. Information from her outpt clinic indicated pt lives in [place] where meals and meds are provided. She has been experiencing auditory and possible visual hallucinations; the content are voices telling her they are going to kill her and her boyfriend. Appears tense, is alert and oriented x 4. Gait is very unsteady with some foot dragging. Denies current suicidal thoughts or plans. Admits to auditory hallucinations but says she \"arian\" has visual hallucinations. Pt is [place] and has a legal guardian; efforts to contact the guardian in [place] were unsuccessful and message to call hospital was left. This is her first mental health admission. Helped to bed with hob elevated 15 degrees. Report given to oncoming shift.   "

## 2018-05-12 NOTE — H&P
"[facility], [place]   Psychiatric History & Physical  Admission date: 5/11/2018        Chief Complaint:   \"I was hearing voices that were talking bad things\"         HPI:     Ekaterina Waldrop is a 68 year old female with history of Schizoaffective Disorder who was admitted on 72 hour hold for worsening psychotic symptoms in the community. Reports indicate that the patient was sent in by her outpatient psychiatrist, Dr. Mark Vasquez, after endorsing worsening AH, which include voices that have been derogatory (calling her worthless) along with voices that are threatening to kill her and her friend. When asked about this during interview today, she does endorse hearing voices, that are distressing and scary, but is somewhat confused as to why she's in the hospital. She denies any suicidal/homicidal ideation, intent or plan. She mentions that is is unsure how much her medications are helping her, and is unclear the exact names or doses she's taking. She denies any tremor, muscle stiffness, but does have some dizziness/lightheadness, and then goes on to talk about feeling like she has a sore throat, and ear pain. When asked about how things where going at her assisted living facility (where she's been since 2009), she mentioned that she liked it there, and had good support. She gets help with bathing, and basing daily routine. She mentions that she has been isolating herself in her room because she believed she had a sore throat, and didn't want to give it to anyone else. Her goal is to get her sore throat looked at and her voices to be toned down. She is open to medication changes. Of note, the patient has a legal guardian, Marie Ojeda, who was attempted to be reached multiple times today, but could not be (messege was left). The patient feels safe in the hospital, is seen in the milieu, and plans to attend groups. Of note, she was alert, and fully oriented to person, place and time.      "    Past Psychiatric History:   Past inpatient treatment: No prior inpatient psychiatric history.   Current outpatient psychiatrist: Dr. Mark Vasquez at [facility], [place]   Current outpatient therapist: None  Other (ACT team etc): None   Past suicide attempts: Reports indicate one prior suicide attempt 2 years ago where she seemingly voluntarily threw herself down the stairs and broke her femur, claiming that the voices had told her to do so.   History of commitments: None  History of ECT: None         Substance Use and History:   Denies any illicit substance use.         Past Medical History:   PAST MEDICAL HISTORY: No past medical history on file.    PAST SURGICAL HISTORY: No past surgical history on file.          Family History:   FAMILY HISTORY: No family history on file.        Social History:   SOCIAL HISTORY:   Social History   Substance Use Topics     Smoking status: Not on file     Smokeless tobacco: Not on file     Alcohol use Not on file            Physical ROS:   The patient endorsed soreness in throat, and ear pain which has been chronic. The remainder of 10-point review of systems was negative except as noted in HPI.         PTA Medications:     Prescriptions Prior to Admission   Medication Sig Dispense Refill Last Dose     acetaminophen (TYLENOL) 325 MG tablet Take 650 mg by mouth 3 times daily and once daily as needed   5/11/2018 at Noon     albuterol (PROAIR HFA/PROVENTIL HFA/VENTOLIN HFA) 108 (90 Base) MCG/ACT Inhaler Inhale 2 puffs into the lungs every 6 hours as needed for shortness of breath / dyspnea or wheezing   Unknown     alendronate (FOSAMAX) 70 MG tablet Take 70 mg by mouth every 7 days   5/11/2018     ARIPiprazole (ABILIFY) 5 MG tablet Take 5 mg by mouth daily   5/11/2018 at AM     azelastine (ASTELIN) 0.1 % spray Spray 1 spray into both nostrils 2 times daily   5/11/2018 at AM     bismuth subsalicylate 525 MG/15ML SUSP Take 15 mLs by mouth 3 times daily as needed   Unknown      budesonide (PULMICORT) 0.5 MG/2ML neb solution Take 0.5 mg by nebulization 2 times daily   5/11/2018 at AM     calcium carbonate 500 MG CHEW Take 1 tablet by mouth 2 times daily   5/11/2018 at AM     calcium-vitamin D (CALCIUM 600 + D) 600-400 MG-UNIT per tablet Take 1 tablet by mouth 3 times daily   5/11/2018 at AM     cetirizine (ZYRTEC) 10 MG tablet Take 10 mg by mouth daily   5/11/2018 at AM     cholecalciferol 1000 units TABS Take 1,000 Units by mouth daily   5/11/2018 at AM     clindamycin (CLEOCIN) 300 MG capsule Take 600 mg by mouth daily   5/11/2018 at AM     escitalopram (LEXAPRO) 20 MG tablet Take 30 mg by mouth daily   5/11/2018 at AM     fluticasone (FLONASE) 50 MCG/ACT spray Spray 1 spray into both nostrils daily   5/11/2018 at AM     hypromellose-dextran (ARTIFICAL TEARS) 0.1-0.3 % SOLN ophthalmic solution Place 1 drop into both eyes 2 times daily   5/11/2018 at AM     ipratropium - albuterol 0.5 mg/2.5 mg/3 mL (DUONEB) 0.5-2.5 (3) MG/3ML neb solution Take 1 vial by nebulization 3 times daily   5/11/2018 at AM     levothyroxine (SYNTHROID/LEVOTHROID) 150 MCG tablet Take 150 mcg by mouth daily   5/11/2018 at AM     LORazepam (ATIVAN) 0.5 MG tablet Take 0.5 mg by mouth 2 times daily   5/11/2018 at AM     Multiple Vitamins-Minerals (MULTIVITAMIN ADULT PO) Take 1 tablet by mouth daily   5/11/2018 at AM     ondansetron (ZOFRAN) 4 MG tablet Take 4 mg by mouth every 8 hours as needed for nausea   Unknown     POLYETHYLENE GLYCOL 3350 PO Take 1 packet by mouth daily   5/11/2018 at AM     senna-docusate (SENOKOT-S;PERICOLACE) 8.6-50 MG per tablet Take 1 tablet by mouth daily   5/11/2018 at AM     sodium fluoride dental gel (PREVIDENT) 1.1 % GEL topical gel Apply to affected area daily   5/11/2018 at AM     traZODone (DESYREL) 100 MG tablet Take 100 mg by mouth At Bedtime   5/10/2018 at PM     ziprasidone (GEODON) 60 MG capsule Take 60 mg by mouth 2 times daily (with meals)   5/11/2018 at AM     ziprasidone  (GEODON) 80 MG capsule Take 80 mg by mouth every evening   5/10/2018 at PM          Allergies:     Allergies   Allergen Reactions     Aspirin      nausea     Erythromycin      nausea          Labs:     Recent Results (from the past 48 hour(s))   Rapid strep screen    Collection Time: 05/11/18  7:03 PM   Result Value Ref Range    Specimen Description Throat     Rapid Strep A Screen       NEGATIVE: No Group A streptococcal antigen detected by immunoassay, await culture report.   CBC with platelets differential    Collection Time: 05/11/18  7:03 PM   Result Value Ref Range    WBC 8.9 4.0 - 11.0 10e9/L    RBC Count 4.25 3.8 - 5.2 10e12/L    Hemoglobin 12.8 11.7 - 15.7 g/dL    Hematocrit 38.1 35.0 - 47.0 %    MCV 90 78 - 100 fl    MCH 30.1 26.5 - 33.0 pg    MCHC 33.6 31.5 - 36.5 g/dL    RDW 14.3 10.0 - 15.0 %    Platelet Count 226 150 - 450 10e9/L    Diff Method Automated Method     % Neutrophils 44.8 %    % Lymphocytes 41.5 %    % Monocytes 8.1 %    % Eosinophils 4.7 %    % Basophils 0.6 %    % Immature Granulocytes 0.3 %    Nucleated RBCs 0 0 /100    Absolute Neutrophil 4.0 1.6 - 8.3 10e9/L    Absolute Lymphocytes 3.7 0.8 - 5.3 10e9/L    Absolute Monocytes 0.7 0.0 - 1.3 10e9/L    Absolute Eosinophils 0.4 0.0 - 0.7 10e9/L    Absolute Basophils 0.1 0.0 - 0.2 10e9/L    Abs Immature Granulocytes 0.0 0 - 0.4 10e9/L    Absolute Nucleated RBC 0.0    Basic metabolic panel    Collection Time: 05/11/18  7:03 PM   Result Value Ref Range    Sodium 144 133 - 144 mmol/L    Potassium 4.1 3.4 - 5.3 mmol/L    Chloride 104 94 - 109 mmol/L    Carbon Dioxide 28 20 - 32 mmol/L    Anion Gap 12 3 - 14 mmol/L    Glucose 92 70 - 99 mg/dL    Urea Nitrogen 21 7 - 30 mg/dL    Creatinine 0.64 0.52 - 1.04 mg/dL    GFR Estimate >90 >60 mL/min/1.7m2    GFR Estimate If Black >90 >60 mL/min/1.7m2    Calcium 9.4 8.5 - 10.1 mg/dL   TSH with free T4 reflex    Collection Time: 05/11/18  7:03 PM   Result Value Ref Range    TSH 1.37 0.40 - 4.00 mU/L   Beta  "strep group A culture    Collection Time: 05/11/18  7:03 PM   Result Value Ref Range    Specimen Description Throat     Culture Micro Culture negative < 24 hours, reincubate           Physical and Psychiatric Examination:     /51  Pulse 78  Temp 98  F (36.7  C) (Tympanic)  Resp 16  Ht 1.511 m (4' 11.5\")  SpO2 94%  Weight is 0 lbs 0 oz  There is no height or weight on file to calculate BMI.    Physical Exam:  I have reviewed the physical exam as documented by ED provider and agree with findings and assessment and have no additional findings to add at this time.    Mental Status Exam:  Appearance: Elderly  female. Is seen sitting hunched forward. Appears mildly disheveled. Uses a walker to help with mobility. Uses an electronic hearing aid.   Attitude:  Pleasant, cooperative   Eye Contact:  Adequate   Mood:  Mildly anxious, overall euthymic   Affect:  Mood congruent   Speech:  Some mild dysarthria, but overall speech was comprehensible.   Language: fluent and intact in English  Psychomotor, Gait, Musculoskeletal:  no evidence of tardive dyskinesia, dystonia, or tics  Throught Process:  logical and goal oriented  Associations:  no loose associations  Thought Content:  Endorsed AH that was derogatory and somewhat commanding/threatening. Is able to mildly distract herself from these intrusive AH. Denies recent VH, but claims to have had experienced VH before in the form of a women telling her negative things. Denies SI/HI ideation, intent or plan.   Insight:  limited  Judgement:  limited  Oriented to:  time, person, and place  Attention Span and Concentration:  fair  Recent and Remote Memory:  limited  Fund of Knowledge:  low-normal         Admission Diagnoses:      Schizoaffective disorder, unspecified type (H)  Borderline Intellectual Disability          Assessment & Plan:     Assessment:  Patient appear to have worsening of her long term AH recently , which has exacerbated in the frequency and " intensity of it's content (with more derogatory AH, and more threatening voices). She is finding it harder to distract herself from it, and becomes overwhelming at times. Looking at her medications, it is concerning that she is on such a high dose of Geodon; she is prescribed 200 mg, when the recommendations are a maximum dose of 160 mg. She was started Abilify to help augment her existing antipsychotic. I determined to lessen her Geodon dose to the recommended maximum of 160 mg and adjust her Abilify to 10 mg for additional antipsychotic coverage through its partial agonism mechanism. Additionally, I will order an EKG due to Geodon being implicated in prolonging of QTc interval, and not having a recent EKG to refer to. I attempted to call her legal guardian multiple times to discuss the patient and my medication adjustments, davis she was not available thus I left a message.     Addendum: I managed to speak to the patient's legal guardian (Marie Chávezagustoadrien), who had provided additional information, particularly that the patient's AH are worse than it has been in the past. She allowed the medication adjustment/changes that I had placed already. She told me the best way to contact her is via her 24 emergency line at [phone].     Plan:  1.) Medication Plan:  - Reduce Geodon  From 200 mg total to 160 mg total;  (reduced 80 mg evening dose to 40 mg and continued the 60 mg BID dose).   - Increase Abilify from 5 mg to 10 mg.    - Order EKG    2.) Psychosocial Plan:  - Discuss patient, progress, and medications with legal guardian    Legal:  72 hour hold (expires 5/16 at 9:55 PM)      Disposition:  Likely back to assisted living facility when symptoms have stabilized        Neo Fish MD  [facility] Psychiat[facility]      Spent  45  minutes on encounter, >50% of which was spent in counseling and/or coordination of care, consisting of taking history, reviewing system, and discussing medication and side  effects

## 2018-05-12 NOTE — ED PROVIDER NOTES
"  History     Chief Complaint   Patient presents with     Pharyngitis     onset last week--reports soreness \"is way down here\" pointing to just above the notch superior to sternum; has not been seen for this; reports difficulty swallowing; reports chills;      Otalgia     reports both hears hurt; onset the same time as her sore throat; denies ear drainage and then stated \"I don't know\";      HPI  Ekaterina Waldrop is a 68 year old female who presents to the ED.  She says she has had ear discomfort, sore throat and cough for a month.  She was on antibiotics a few weeks ago but that didn't help.      DEC  also evaluated the patient and called her assisted living facility.  She has lived there for 10 years.  This past week she has been isolating. She is talking about hearing mean voices and also seeing things.  She has schizoaffective disorder. This is not typical for her.  She had recent pneumonia which was treated.      I have reviewed the Medications, Allergies, Past Medical and Surgical History, and Social History in the Epic system.    Review of Systems   Constitutional: Negative for chills and fever.   HENT: Positive for ear pain and sore throat.    Respiratory: Positive for cough.        Physical Exam   BP: 119/44  Pulse: 74  Temp: 99.2  F (37.3  C)  Resp: 20  Height: 151.1 cm (4' 11.5\")  SpO2: 94 %      Physical Exam   Constitutional: She is oriented to person, place, and time. She appears well-developed and well-nourished. No distress.   HENT:   Head: Normocephalic and atraumatic.   Right Ear: External ear normal.   Left Ear: External ear normal.   Nose: Nose normal.   Mouth/Throat: Oropharynx is clear and moist.   Dry mucous membranes   Eyes: EOM are normal. Pupils are equal, round, and reactive to light.   Neck: Normal range of motion. Neck supple.   Cardiovascular: Normal rate, regular rhythm and normal heart sounds.    Pulmonary/Chest: Effort normal and breath sounds normal.   Abdominal: Soft. " There is no tenderness.   Musculoskeletal: Normal range of motion.   Neurological: She is alert and oriented to person, place, and time.   Skin: Skin is warm and dry. She is not diaphoretic.   Nursing note and vitals reviewed.      ED Course     ED Course     Procedures           Labs Ordered and Resulted from Time of ED Arrival Up to the Time of Departure from the ED   CBC WITH PLATELETS DIFFERENTIAL   BASIC METABOLIC PANEL   TSH WITH FREE T4 REFLEX   URINE MACROSCOPIC WITH REFLEX TO MICRO   RAPID STREP SCREEN   BETA STREP GROUP A CULTURE            Assessments & Plan (with Medical Decision Making)   The patient presents due to having sore ears, throat and cough.  However, epic review shows that she has been hallucinating and was sent in for evaluation. She is hearing voices and also seeing things, which is not normal for her.  She was seen by the DEC  and the case was discussed with the [facility] psychiatrist.  We feel she would benefit from admission for further observation and to evaluate need for med adjustment. Her psychiatrist referred her here today. We called her legal guardian for permission to admit. They were unavailable so 72 hour hold was placed.   I have reviewed the nursing notes.    I have reviewed the findings, diagnosis, plan and need for follow up with the patient.    New Prescriptions    No medications on file       Final diagnoses:   Schizoaffective disorder, unspecified type (H)   Hallucinations       5/11/2018   [facility], [place], EMERGENCY DEPARTMENT     Kathie Banerjee MD  05/11/18 [id number]

## 2018-05-12 NOTE — PROGRESS NOTES
05/12/18 1330   Behavioral Health   Hallucinations auditory   Thinking distractable   Orientation person: oriented;place: oriented   Memory baseline memory   Insight admits / accepts   Judgement impaired   Eye Contact at examiner   Affect full range affect   Mood mood is calm;depressed;anxious   Physical Appearance/Attire attire appropriate to age and situation   Hygiene well groomed   Suicidality other (see comments)  (pt denies)   1. Wish to be Dead No   2. Non-Specific Active Suicidal Thoughts  No   Self Injury other (see comment)  (pt denies)   Elopement (none noted)   Activity withdrawn;other (see comment)  (present in milieu and with therapy dog)   Speech clear;coherent   Medication Sensitivity no stated side effects;no observed side effects   Psychomotor / Gait slow;foot dragging  (uses 4WW)   Safety   Suicidality Status 15   Fall fall (yellow) wristband;room close to team care station (desk);clutter free environment;monitor when out of bed;encourage slow rising from bed and chair;bed in low position   Coping/Psychosocial   Verbalized Emotional State acceptance;hopefulness   Plan Of Care Reviewed With patient   Patient Agreement with Plan of Care agrees   Psycho Education   Type of Intervention 1:1 intervention   Response participates, initiates socially appropriate   Hours 0.5   Treatment Detail check in   Group Therapy Session   Group Attendance other (see comments)  (meeting with CTC/ during group)   Activities of Daily Living   Hygiene/Grooming with assistance   Oral Hygiene with assistance   Dress scrubs (behavioral health);with assistance   Laundry unable to complete   Room Organization unable   Behavioral Health Interventions   Behavioral Disturbance maintain safety precautions;maintain safe secure environment;reality orientation;simple, clear language;decrease environmental stimulation;assist in development of alternative methods of expressive communication;encourage clear communication of  "needs;redirection of intrusive behaviors;encourage nutrition and hydration;encourage participation / independence with adls;provide emotional support;establish therapeutic relationship;assist with developing & utilizing healthy coping strategies;provide positive feedback for use of effective coping skills;build upon strengths;monitor need for prn medication;monitor confusion, memory loss, decision making ability and reorient / intervent as needed   Social and Therapeutic Interventions (Behavioral Disturbance) encourage socialization with peers;encourage effective boundaries with peers;encourage participation in therapeutic groups and milieu activities     Pt was up in milieu and active with peers and staff. Pt affect full range, mood anxious and depressed. Pt denies SI/SIB at this time, but endorses AH and stated \"I just want the voices to be gone.\" Pt encouraged to attend afternoon group, but spent the morning checking in with ROMI and Dr. Pt hopeful about \"changing my medications to get ride of the voices\". Pt given glasses and a book to read due to her enjoyment of her book club. Pt also given a pocket talker due to [employer]. Pt walks with 4WW foot dragging, but appears to get from room to lounge independently. Pt reminded to ask staff for assistance if necessary. Pt will need some assistance with ADLs, but independent with teeth/hair. Pt calm, cooperative, and pleasant with check in.  "

## 2018-05-13 LAB
ALBUMIN UR-MCNC: NEGATIVE MG/DL
APPEARANCE UR: CLEAR
BACTERIA #/AREA URNS HPF: ABNORMAL /HPF
BACTERIA SPEC CULT: NORMAL
BILIRUB UR QL STRIP: NEGATIVE
COLOR UR AUTO: ABNORMAL
GLUCOSE UR STRIP-MCNC: NEGATIVE MG/DL
HGB UR QL STRIP: NEGATIVE
KETONES UR STRIP-MCNC: NEGATIVE MG/DL
LEUKOCYTE ESTERASE UR QL STRIP: ABNORMAL
NITRATE UR QL: NEGATIVE
PH UR STRIP: 6.5 PH (ref 5–7)
RBC #/AREA URNS AUTO: 1 /HPF (ref 0–2)
SOURCE: ABNORMAL
SP GR UR STRIP: 1.01 (ref 1–1.03)
SPECIMEN SOURCE: NORMAL
SQUAMOUS #/AREA URNS AUTO: <1 /HPF (ref 0–1)
UROBILINOGEN UR STRIP-MCNC: NORMAL MG/DL (ref 0–2)
WBC #/AREA URNS AUTO: 2 /HPF (ref 0–5)

## 2018-05-13 PROCEDURE — 12400007 ZZH R&B MH INTERMEDIATE UMMC

## 2018-05-13 PROCEDURE — 25000132 ZZH RX MED GY IP 250 OP 250 PS 637: Performed by: EMERGENCY MEDICINE

## 2018-05-13 PROCEDURE — 94640 AIRWAY INHALATION TREATMENT: CPT | Mod: 76

## 2018-05-13 PROCEDURE — 25000132 ZZH RX MED GY IP 250 OP 250 PS 637: Performed by: PSYCHIATRY & NEUROLOGY

## 2018-05-13 PROCEDURE — 40000275 ZZH STATISTIC RCP TIME EA 10 MIN

## 2018-05-13 PROCEDURE — 25000132 ZZH RX MED GY IP 250 OP 250 PS 637: Performed by: PHYSICIAN ASSISTANT

## 2018-05-13 PROCEDURE — 25000125 ZZHC RX 250: Performed by: EMERGENCY MEDICINE

## 2018-05-13 PROCEDURE — 94640 AIRWAY INHALATION TREATMENT: CPT

## 2018-05-13 PROCEDURE — 81001 URINALYSIS AUTO W/SCOPE: CPT | Performed by: EMERGENCY MEDICINE

## 2018-05-13 PROCEDURE — 99231 SBSQ HOSP IP/OBS SF/LOW 25: CPT | Performed by: PHYSICIAN ASSISTANT

## 2018-05-13 RX ORDER — IPRATROPIUM BROMIDE AND ALBUTEROL SULFATE 2.5; .5 MG/3ML; MG/3ML
3 SOLUTION RESPIRATORY (INHALATION) EVERY 4 HOURS PRN
Status: DISCONTINUED | OUTPATIENT
Start: 2018-05-13 | End: 2018-05-16 | Stop reason: HOSPADM

## 2018-05-13 RX ORDER — AMOXICILLIN 250 MG
1 CAPSULE ORAL 2 TIMES DAILY
Status: DISCONTINUED | OUTPATIENT
Start: 2018-05-13 | End: 2018-05-16 | Stop reason: HOSPADM

## 2018-05-13 RX ORDER — POLYETHYLENE GLYCOL 3350 17 G/17G
17 POWDER, FOR SOLUTION ORAL DAILY
Status: DISCONTINUED | OUTPATIENT
Start: 2018-05-13 | End: 2018-05-16 | Stop reason: HOSPADM

## 2018-05-13 RX ORDER — POLYETHYLENE GLYCOL 3350 17 G/17G
17 POWDER, FOR SOLUTION ORAL DAILY PRN
Status: DISCONTINUED | OUTPATIENT
Start: 2018-05-13 | End: 2018-05-16 | Stop reason: HOSPADM

## 2018-05-13 RX ORDER — CETIRIZINE HYDROCHLORIDE 10 MG/1
10 TABLET ORAL DAILY
Status: DISCONTINUED | OUTPATIENT
Start: 2018-05-13 | End: 2018-05-16 | Stop reason: HOSPADM

## 2018-05-13 RX ADMIN — SENNOSIDES AND DOCUSATE SODIUM 1 TABLET: 8.6; 5 TABLET ORAL at 21:28

## 2018-05-13 RX ADMIN — AZELASTINE HYDROCHLORIDE 1 SPRAY: 137 SPRAY, METERED NASAL at 21:24

## 2018-05-13 RX ADMIN — POLYETHYLENE GLYCOL 3350 17 G: 17 POWDER, FOR SOLUTION ORAL at 14:37

## 2018-05-13 RX ADMIN — ARIPIPRAZOLE 10 MG: 5 TABLET ORAL at 08:34

## 2018-05-13 RX ADMIN — IPRATROPIUM BROMIDE AND ALBUTEROL SULFATE 3 ML: .5; 3 SOLUTION RESPIRATORY (INHALATION) at 08:43

## 2018-05-13 RX ADMIN — ZIPRASIDONE HCL 60 MG: 60 CAPSULE ORAL at 08:34

## 2018-05-13 RX ADMIN — DEXTRAN 70 AND HYPROMELLOSE 2910 2 DROP: 1; 3 SOLUTION/ DROPS OPHTHALMIC at 21:24

## 2018-05-13 RX ADMIN — BUDESONIDE 0.5 MG: 0.5 INHALANT RESPIRATORY (INHALATION) at 08:42

## 2018-05-13 RX ADMIN — LORAZEPAM 0.5 MG: 0.5 TABLET ORAL at 08:34

## 2018-05-13 RX ADMIN — IPRATROPIUM BROMIDE AND ALBUTEROL SULFATE 3 ML: .5; 3 SOLUTION RESPIRATORY (INHALATION) at 22:20

## 2018-05-13 RX ADMIN — CETIRIZINE HYDROCHLORIDE 10 MG: 10 TABLET, FILM COATED ORAL at 14:37

## 2018-05-13 RX ADMIN — CALCIUM CARBONATE (ANTACID) CHEW TAB 500 MG 500 MG: 500 CHEW TAB at 08:34

## 2018-05-13 RX ADMIN — IPRATROPIUM BROMIDE AND ALBUTEROL SULFATE 3 ML: .5; 3 SOLUTION RESPIRATORY (INHALATION) at 13:51

## 2018-05-13 RX ADMIN — TRAZODONE HYDROCHLORIDE 100 MG: 100 TABLET ORAL at 21:28

## 2018-05-13 RX ADMIN — BUDESONIDE 0.5 MG: 0.5 INHALANT RESPIRATORY (INHALATION) at 22:20

## 2018-05-13 RX ADMIN — ZIPRASIDONE HCL 40 MG: 40 CAPSULE ORAL at 21:29

## 2018-05-13 RX ADMIN — FLUTICASONE PROPIONATE 1 SPRAY: 50 SPRAY, METERED NASAL at 08:38

## 2018-05-13 RX ADMIN — DEXTRAN 70 AND HYPROMELLOSE 2910 2 DROP: 1; 3 SOLUTION/ DROPS OPHTHALMIC at 14:42

## 2018-05-13 RX ADMIN — LORAZEPAM 0.5 MG: 0.5 TABLET ORAL at 21:28

## 2018-05-13 RX ADMIN — ESCITALOPRAM OXALATE 30 MG: 20 TABLET ORAL at 08:33

## 2018-05-13 RX ADMIN — AZELASTINE HYDROCHLORIDE 1 SPRAY: 137 SPRAY, METERED NASAL at 08:35

## 2018-05-13 RX ADMIN — CALCIUM CARBONATE (ANTACID) CHEW TAB 500 MG 500 MG: 500 CHEW TAB at 21:28

## 2018-05-13 RX ADMIN — LEVOTHYROXINE SODIUM 150 MCG: 75 TABLET ORAL at 08:35

## 2018-05-13 RX ADMIN — DEXTRAN 70 AND HYPROMELLOSE 2910 1 DROP: 1; 3 SOLUTION/ DROPS OPHTHALMIC at 08:38

## 2018-05-13 RX ADMIN — ZIPRASIDONE HCL 60 MG: 60 CAPSULE ORAL at 17:36

## 2018-05-13 ASSESSMENT — ACTIVITIES OF DAILY LIVING (ADL)
LAUNDRY: UNABLE TO COMPLETE
DRESS: SCRUBS (BEHAVIORAL HEALTH)
ORAL_HYGIENE: WITH ASSISTANCE
GROOMING: WITH ASSISTANCE

## 2018-05-13 NOTE — PLAN OF CARE
"Problem: Behavioral Disturbance  Goal: Behavioral Disturbance  Signs and symptoms of listed problems will be absent or manageable by discharge or transition of care.    Pt will take 100% of scheduled medications.  Pt will report a decrease in visual and auditory hallucinations.  Pt will participate in daily groups.    Outcome: Improving  Pt is making progress toward identified goals. She is taking medication as prescribed and attending scheduled activities. Reports auditory hallucinations continue.    Comments: Nursing reassessment:   Pt stated this evening that she is still hearing voices, that \"sometimes\" they say mean things about her, but that most of the time she just hears them as noise. Was in the lounge before and during dinner and attended community meeting. Is using pocket-talker to increase her ability to communicate in the groups. Isolative to her room this evening, reading. Startles when staff enter the room but denies that she is feeling unsafe or that voices are worse. Affect brightens with interactions.  "

## 2018-05-13 NOTE — PROGRESS NOTES
"S: Asked to see patient for increased lacrimation from eye.    In short, Ekaterina Waldrop is a 68 year old female with a past medical history of mild intellectual disability, schizoaffective disorder, advanced kyphosis, osteoporosis, GERD, seasonal allergies, hypothyroidism,     Discussed with patient. Unclear reliability of historian. She notes her eye is itchy, denies eye pain initially. She notes some throat pain and ear pain, unclear duration.    O:  /51  Pulse 78  Temp 97.3  F (36.3  C) (Tympanic)  Resp 16  Ht 1.511 m (4' 11.5\")  SpO2 96%  GEN: Awake, alert, sitting comfortably in chair   HEENT: Normocephalic, atraumatic. Oropharynx w/o lesion, posterior pharynx with mild erythema, no edema. Right eye with increased scleral injection, inferior conjunctiva w/ edema and erythema, lacrimation present, no obvious pustular drainage. Bilateral ear canals w/ cerumen, visualized TMs w/o bulging or erythema. No pain w/ manipulation of external ear.     A/P:  #Likely viral URI w/ conjunctivitis vs. Seasonal allergies: Unclear duration of symptoms or triggers. CXR normal 05/11. WBC count normal and diff normal, remains afebrile. Rapid strep negative and culture w/ NGTD.  -Warm compresses to right eye TID  -Continue home zyrtec  -Agree with continuing home Flonase  -Continue artificial tears Q6 hours  -Continue sore throat lozenges PRN  -If worsening may consider mild anti histamine drops, but will trial above therapies first, notify medicine if worsening symptoms    #Schizoaffective disorder  -Management per psychiatry    #Chronic cough: Evaluated by pulmonary, Unable to complete PFTs. CXR clear on admission. Sating well on RA.  -Continue scheduled nebs pulmicort BID, duonebs TID and inhalers  -F/U with pulmonary PRN    #Hypothyroidism: Continue home synthroid at 150 mcg qday. TSH normal on admission.   #Osteoporosis, kyphosis: Continue walker. Continue fosamax, calcium and vitamin D supplements.  #GERD: " Continue tums, maalox PRN.  #Constipation: Continue home miralax and senna.     Full consult to follow.     RUBEN Guerrero

## 2018-05-13 NOTE — PROGRESS NOTES
"Pt is somewhat bright on approach.  She is pleasant, calm and cooperative.  Attended groups today.  Participated in group craft project. Continues to endorses audio hallucinations - \"mostly sound.\"  Denies they are command in nature.       R eye reddened, slightly puffy with noticeable exudate this morning.  Pt seen by Internal Medicine - warm pack applied as ordered.    "

## 2018-05-14 LAB — INTERPRETATION ECG - MUSE: NORMAL

## 2018-05-14 PROCEDURE — 99232 SBSQ HOSP IP/OBS MODERATE 35: CPT | Performed by: PHYSICIAN ASSISTANT

## 2018-05-14 PROCEDURE — 25000132 ZZH RX MED GY IP 250 OP 250 PS 637: Performed by: PSYCHIATRY & NEUROLOGY

## 2018-05-14 PROCEDURE — 12400007 ZZH R&B MH INTERMEDIATE UMMC

## 2018-05-14 PROCEDURE — 99232 SBSQ HOSP IP/OBS MODERATE 35: CPT | Performed by: PSYCHIATRY & NEUROLOGY

## 2018-05-14 PROCEDURE — 25000125 ZZHC RX 250: Performed by: EMERGENCY MEDICINE

## 2018-05-14 PROCEDURE — 25000132 ZZH RX MED GY IP 250 OP 250 PS 637: Performed by: PHYSICIAN ASSISTANT

## 2018-05-14 PROCEDURE — 90853 GROUP PSYCHOTHERAPY: CPT

## 2018-05-14 PROCEDURE — 99207 ZZC CONSULT E&M CHANGED TO SUBSEQUENT LEVEL: CPT | Performed by: PHYSICIAN ASSISTANT

## 2018-05-14 PROCEDURE — 25000132 ZZH RX MED GY IP 250 OP 250 PS 637: Performed by: EMERGENCY MEDICINE

## 2018-05-14 PROCEDURE — 40000275 ZZH STATISTIC RCP TIME EA 10 MIN

## 2018-05-14 PROCEDURE — 97150 GROUP THERAPEUTIC PROCEDURES: CPT | Mod: GO

## 2018-05-14 PROCEDURE — 94640 AIRWAY INHALATION TREATMENT: CPT

## 2018-05-14 PROCEDURE — 94640 AIRWAY INHALATION TREATMENT: CPT | Mod: 76

## 2018-05-14 RX ADMIN — IPRATROPIUM BROMIDE AND ALBUTEROL SULFATE 3 ML: .5; 3 SOLUTION RESPIRATORY (INHALATION) at 20:46

## 2018-05-14 RX ADMIN — TRAZODONE HYDROCHLORIDE 100 MG: 100 TABLET ORAL at 21:12

## 2018-05-14 RX ADMIN — ZIPRASIDONE HCL 60 MG: 60 CAPSULE ORAL at 08:33

## 2018-05-14 RX ADMIN — DEXTRAN 70 AND HYPROMELLOSE 2910 2 DROP: 1; 3 SOLUTION/ DROPS OPHTHALMIC at 21:12

## 2018-05-14 RX ADMIN — AZELASTINE HYDROCHLORIDE 1 SPRAY: 137 SPRAY, METERED NASAL at 19:16

## 2018-05-14 RX ADMIN — DEXTRAN 70 AND HYPROMELLOSE 2910 2 DROP: 1; 3 SOLUTION/ DROPS OPHTHALMIC at 02:58

## 2018-05-14 RX ADMIN — FLUTICASONE PROPIONATE 1 SPRAY: 50 SPRAY, METERED NASAL at 08:39

## 2018-05-14 RX ADMIN — CALCIUM CARBONATE (ANTACID) CHEW TAB 500 MG 500 MG: 500 CHEW TAB at 19:16

## 2018-05-14 RX ADMIN — DEXTRAN 70 AND HYPROMELLOSE 2910 2 DROP: 1; 3 SOLUTION/ DROPS OPHTHALMIC at 09:44

## 2018-05-14 RX ADMIN — LEVOTHYROXINE SODIUM 150 MCG: 75 TABLET ORAL at 08:34

## 2018-05-14 RX ADMIN — ESCITALOPRAM OXALATE 30 MG: 20 TABLET ORAL at 08:33

## 2018-05-14 RX ADMIN — ZIPRASIDONE HCL 60 MG: 60 CAPSULE ORAL at 17:57

## 2018-05-14 RX ADMIN — BUDESONIDE 0.5 MG: 0.5 INHALANT RESPIRATORY (INHALATION) at 08:44

## 2018-05-14 RX ADMIN — LORAZEPAM 0.5 MG: 0.5 TABLET ORAL at 21:11

## 2018-05-14 RX ADMIN — SENNOSIDES AND DOCUSATE SODIUM 1 TABLET: 8.6; 5 TABLET ORAL at 08:41

## 2018-05-14 RX ADMIN — SENNOSIDES AND DOCUSATE SODIUM 1 TABLET: 8.6; 5 TABLET ORAL at 19:16

## 2018-05-14 RX ADMIN — AZELASTINE HYDROCHLORIDE 1 SPRAY: 137 SPRAY, METERED NASAL at 08:39

## 2018-05-14 RX ADMIN — CALCIUM CARBONATE (ANTACID) CHEW TAB 500 MG 500 MG: 500 CHEW TAB at 08:41

## 2018-05-14 RX ADMIN — IPRATROPIUM BROMIDE AND ALBUTEROL SULFATE 3 ML: .5; 3 SOLUTION RESPIRATORY (INHALATION) at 08:44

## 2018-05-14 RX ADMIN — BUDESONIDE 0.5 MG: 0.5 INHALANT RESPIRATORY (INHALATION) at 20:46

## 2018-05-14 RX ADMIN — CETIRIZINE HYDROCHLORIDE 10 MG: 10 TABLET, FILM COATED ORAL at 08:34

## 2018-05-14 RX ADMIN — LORAZEPAM 0.5 MG: 0.5 TABLET ORAL at 08:33

## 2018-05-14 RX ADMIN — ZIPRASIDONE HCL 40 MG: 40 CAPSULE ORAL at 21:12

## 2018-05-14 RX ADMIN — IPRATROPIUM BROMIDE AND ALBUTEROL SULFATE 3 ML: .5; 3 SOLUTION RESPIRATORY (INHALATION) at 14:20

## 2018-05-14 RX ADMIN — ARIPIPRAZOLE 10 MG: 5 TABLET ORAL at 08:33

## 2018-05-14 RX ADMIN — POLYETHYLENE GLYCOL 3350 17 G: 17 POWDER, FOR SOLUTION ORAL at 08:34

## 2018-05-14 ASSESSMENT — ACTIVITIES OF DAILY LIVING (ADL)
ORAL_HYGIENE: INDEPENDENT
DRESS: INDEPENDENT;SCRUBS (BEHAVIORAL HEALTH)
GROOMING: INDEPENDENT
LAUNDRY: UNABLE TO COMPLETE
DRESS: INDEPENDENT
GROOMING: INDEPENDENT
ORAL_HYGIENE: INDEPENDENT

## 2018-05-14 NOTE — PLAN OF CARE
Problem: General Plan of Care (Inpatient Behavioral)  Goal: Individualization/Patient Specific Goal (IP Behavioral)  The patient and/or their representative will achieve their patient-specific goals related to the plan of care.    The patient-specific goals include:    Illness Management Recovery model: Objectives    Patient will identify reason(s) for hospitalization from their perspective.  Patient will identify a minimum of three goals for discharge.  Patient will identify a minimum of three triggers that may increase their symptoms.  Patient will identify a minimum of three coping skills they can do to stay well.   Patient will identify their support system to demonstrate readiness for discharge.       Reasons you are in the hospital:  1)  I don't know  2)  Hearing voices    Goals for Discharge:  1)  Be able to concentrate  2)  Be more social

## 2018-05-14 NOTE — PLAN OF CARE
Problem: General Plan of Care (Inpatient Behavioral)  Goal: Team Discussion  Team Plan:    BEHAVIORAL TEAM DISCUSSION    Participants: Britt Florian RN, ARIN Sawant, Alivia Hinojosa OT  Progress: New admit  Continued Stay Criteria/Rationale: Psychosis  Medical/Physical: See medical notes  Precautions:   Behavioral Orders   Procedures     Code 1 - Restrict to Unit     Routine Programming     As clinically indicated     Status 15     Every 15 minutes.     Plan: The plan is to assess the patient for mental health and medication needs.  The patient will be prescribed   medications to treat the identified symptoms.  Upon discharge the patient will be referred to services as  ppropriate based on the assessment.  Rationale for change in precautions or plan: N/A      Problem: Patient Care Overview  Goal: Team Discussion  Team Plan:    BEHAVIORAL TEAM DISCUSSION    Participants: Britt Florian RN, ARIN Sawant, Alivia Hinojosa OT  Progress: New admit  Continued Stay Criteria/Rationale: Psychosis  Medical/Physical: See medical notes  Precautions:   Behavioral Orders   Procedures     Code 1 - Restrict to Unit     Routine Programming     As clinically indicated     Status 15     Every 15 minutes.     Plan: The plan is to assess the patient for mental health and medication needs.  The patient will be prescribed   medications to treat the identified symptoms.  Upon discharge the patient will be referred to services as  ppropriate based on the assessment.  Rationale for change in precautions or plan: N/A

## 2018-05-14 NOTE — CONSULTS
Internal Medicine History and Physical    Patient Name: Ekaterina Waldrop MRN# [id number]   Age: 68 year old YOB: 1950     Date of Admission: 5/11/2018      Primary care provider: No Ref-Primary, Physician         Assessment and Plan:   Ekaterina Waldrop is a 68 year old female w/ a pmh of mild intellectual disability, schizoaffective disorder, advanced kyphosis, osteoporosis, GERD, seasonal allergies, and hypothyroidism who was admitted to [facility] station 3B due to worsening psychotic symptoms.     # Decompensated schizoaffective disorder.  Management per primary team, psychiatry.     Likely viral URI w/ conjunctivitis vs. Seasonal allergies. Unclear duration of symptoms or triggers. CXR normal 05/11. WBC count normal and diff normal, remains afebrile. Rapid strep negative and culture w/ NGTD.  -Warm compresses to right eye TID  -Continue home zyrtec  -Agree with continuing home Flonase  -Continue artificial tears Q6 hours  -Continue sore throat lozenges PRN  -If worsening may consider mild anti histamine drops, but will trial above therapies first, notify medicine if worsening symptoms     Chronic cough. Evaluated by pulmonary, Unable to complete PFTs. CXR clear on admission. Sating well on RA.  -Continue scheduled nebs pulmicort BID, duonebs TID and inhalers  -F/U with pulmonary PRN     Hypothyroidism. Continue home synthroid at 150 mcg qday. TSH normal on admission.   Osteoporosis, kyphosis. Continue walker. Continue fosamax, calcium and vitamin D supplements.  GERD. Continue tums, maalox PRN.  Constipation. Continue home miralax and senna.          Internal Medicine will sign off at this time. Please notify if any intercurrent medical questions or concerns arise. Thank you for involving me in this patients care.         Celine Mckeon  Internal Medicine [facility] Hospitalist  Pager: [phone]           HPI:   History is obtained from the patient and medical record.   Ekaterina Waldrop is a 68  year old female w/ a pmh of mild intellectual disability, schizoaffective disorder, advanced kyphosis, osteoporosis, GERD, seasonal allergies, and hypothyroidism who was admitted to [facility] station 3B due to worsening psychotic symptoms.     Today complains of ongoing cough which is unchanged from prior. No further complaints. Denies chest pain, shortness of breath, or difficulty breathing.          Review of Systems:   A 10 point ROS was performed and negative unless otherwise noted in HPI.          Past Medical History:   Reviewed and updated in Epic.  No past medical history on file.         Past Surgical History:   Reviewed and updated in Epic.  No past surgical history on file.         Social History:   Reviewed and updated in Epic.  Social History     Social History     Marital status: Single     Spouse name: N/A     Number of children: N/A     Years of education: N/A     Occupational History     Not on file.     Social History Main Topics     Smoking status: Not on file     Smokeless tobacco: Not on file     Alcohol use Not on file     Drug use: Not on file     Sexual activity: Not on file     Other Topics Concern     Not on file     Social History Narrative            Family History:   Reviewed and updated in Epic.  No family history on file.         Allergies:      Allergies   Allergen Reactions     Aspirin      nausea     Erythromycin      nausea            Medications:     Prescriptions Prior to Admission   Medication Sig Dispense Refill Last Dose     acetaminophen (TYLENOL) 325 MG tablet Take 650 mg by mouth 3 times daily and once daily as needed   5/11/2018 at Noon     albuterol (PROAIR HFA/PROVENTIL HFA/VENTOLIN HFA) 108 (90 Base) MCG/ACT Inhaler Inhale 2 puffs into the lungs every 6 hours as needed for shortness of breath / dyspnea or wheezing   Unknown     alendronate (FOSAMAX) 70 MG tablet Take 70 mg by mouth every 7 days   5/11/2018     ARIPiprazole (ABILIFY) 5 MG tablet Take 5 mg by mouth daily    5/11/2018 at AM     azelastine (ASTELIN) 0.1 % spray Spray 1 spray into both nostrils 2 times daily   5/11/2018 at AM     bismuth subsalicylate 525 MG/15ML SUSP Take 15 mLs by mouth 3 times daily as needed   Unknown     budesonide (PULMICORT) 0.5 MG/2ML neb solution Take 0.5 mg by nebulization 2 times daily   5/11/2018 at AM     calcium carbonate 500 MG CHEW Take 1 tablet by mouth 2 times daily   5/11/2018 at AM     calcium-vitamin D (CALCIUM 600 + D) 600-400 MG-UNIT per tablet Take 1 tablet by mouth 3 times daily   5/11/2018 at AM     cetirizine (ZYRTEC) 10 MG tablet Take 10 mg by mouth daily   5/11/2018 at AM     cholecalciferol 1000 units TABS Take 1,000 Units by mouth daily   5/11/2018 at AM     clindamycin (CLEOCIN) 300 MG capsule Take 600 mg by mouth daily   5/11/2018 at AM     escitalopram (LEXAPRO) 20 MG tablet Take 30 mg by mouth daily   5/11/2018 at AM     fluticasone (FLONASE) 50 MCG/ACT spray Spray 1 spray into both nostrils daily   5/11/2018 at AM     hypromellose-dextran (ARTIFICAL TEARS) 0.1-0.3 % SOLN ophthalmic solution Place 1 drop into both eyes 2 times daily   5/11/2018 at AM     ipratropium - albuterol 0.5 mg/2.5 mg/3 mL (DUONEB) 0.5-2.5 (3) MG/3ML neb solution Take 1 vial by nebulization 3 times daily   5/11/2018 at AM     levothyroxine (SYNTHROID/LEVOTHROID) 150 MCG tablet Take 150 mcg by mouth daily   5/11/2018 at AM     LORazepam (ATIVAN) 0.5 MG tablet Take 0.5 mg by mouth 2 times daily   5/11/2018 at AM     Multiple Vitamins-Minerals (MULTIVITAMIN ADULT PO) Take 1 tablet by mouth daily   5/11/2018 at AM     ondansetron (ZOFRAN) 4 MG tablet Take 4 mg by mouth every 8 hours as needed for nausea   Unknown     POLYETHYLENE GLYCOL 3350 PO Take 1 packet by mouth daily   5/11/2018 at AM     senna-docusate (SENOKOT-S;PERICOLACE) 8.6-50 MG per tablet Take 1 tablet by mouth daily   5/11/2018 at AM     sodium fluoride dental gel (PREVIDENT) 1.1 % GEL topical gel Apply to affected area daily    "5/11/2018 at AM     traZODone (DESYREL) 100 MG tablet Take 100 mg by mouth At Bedtime   5/10/2018 at PM     ziprasidone (GEODON) 60 MG capsule Take 60 mg by mouth 2 times daily (with meals)   5/11/2018 at AM     ziprasidone (GEODON) 80 MG capsule Take 80 mg by mouth every evening   5/10/2018 at PM             Physical Exam:   Blood pressure 133/48, pulse 91, temperature 98  F (36.7  C), temperature source Tympanic, resp. rate 16, height 1.511 m (4' 11.5\"), SpO2 96 %.  GENERAL: Alert and oriented x 3. NAD.   HEENT: Anicteric sclera. PERRL. Mucous membranes moist and without lesions.   CV: RRR. S1, S2. No murmurs appreciated.   RESPIRATORY: Effort normal on room air. Lungs CTAB with no wheezing, rales, rhonchi.   GI: Abdomen soft and non distended, bowel sounds present. No tenderness, rebound, guarding.   MUSCULOSKELETAL: No joint swelling or tenderness. Moves all extremities.   NEUROLOGICAL: No focal deficits. Strength 5/5 bilaterally in upper and lower extremities.   EXTREMITIES: No peripheral edema. Intact bilateral pedal pulses.   SKIN: No jaundice. No rashes.       "

## 2018-05-14 NOTE — PROGRESS NOTES
Pt has been in milieu more this evening. Continues to state she's hearing voices, and that the content or frequency hasn't changed since admission. Played bingo this evening with peers, and seemed to enjoy it. No reports of pain. Affect brightens with approach.

## 2018-05-14 NOTE — PROGRESS NOTES
"[facility], [place]   Psychiatric Progress Note        Interim History:   The patient's care was discussed with the treatment team during the daily team meeting and/or staff's chart notes were reviewed.  Staff report patient slept 6 hours. Was admitted under a 72-hour hold but does have a guardian.     The patient reports that she \"doesn't like \" due to her past job at [email or web address] where everyone was \"grumpy\" on . Mood is sad and depressed. Says that she has had some thoughts to join her mother who  in . Has had AH telling her to kill herself. Did attempt in . Appetite is low.          Medications:       ARIPiprazole  10 mg Oral Daily     azelastine  1 spray Both Nostrils BID     budesonide  0.5 mg Nebulization BID     calcium carbonate  1 tablet Oral BID     cetirizine  10 mg Oral Daily     escitalopram  30 mg Oral Daily     fluticasone  1 spray Both Nostrils Daily     hypromellose-dextran  2 drop Both Eyes Q6H     ipratropium - albuterol 0.5 mg/2.5 mg/3 mL  1 vial Nebulization TID     levothyroxine  150 mcg Oral Daily     LORazepam  0.5 mg Oral BID     polyethylene glycol  17 g Oral Daily     senna-docusate  1 tablet Oral BID     traZODone  100 mg Oral At Bedtime     ziprasidone  40 mg Oral QPM     ziprasidone  60 mg Oral BID w/meals          Allergies:     Allergies   Allergen Reactions     Aspirin      nausea     Erythromycin      nausea          Labs:     Recent Results (from the past 24 hour(s))   UA reflex to Microscopic    Collection Time: 18  7:16 PM   Result Value Ref Range    Color Urine Light Yellow     Appearance Urine Clear     Glucose Urine Negative NEG^Negative mg/dL    Bilirubin Urine Negative NEG^Negative    Ketones Urine Negative NEG^Negative mg/dL    Specific Gravity Urine 1.008 1.003 - 1.035    Blood Urine Negative NEG^Negative    pH Urine 6.5 5.0 - 7.0 pH    Protein Albumin Urine Negative NEG^Negative mg/dL    Urobilinogen mg/dL Normal 0.0 - " "2.0 mg/dL    Nitrite Urine Negative NEG^Negative    Leukocyte Esterase Urine Small (A) NEG^Negative    Source Midstream Urine     RBC Urine 1 0 - 2 /HPF    WBC Urine 2 0 - 5 /HPF    Bacteria Urine Few (A) NEG^Negative /HPF    Squamous Epithelial /HPF Urine <1 0 - 1 /HPF          Psychiatric Examination:     /48  Pulse 91  Temp 98  F (36.7  C) (Tympanic)  Resp 16  Ht 1.511 m (4' 11.5\")  SpO2 96%  Weight is 0 lbs 0 oz  There is no height or weight on file to calculate BMI.  Orthostatic Vitals       Most Recent      Sitting Orthostatic /48 05/14 0800    Sitting Orthostatic Pulse (bpm) 91 05/14 0800    Standing Orthostatic /61 05/14 0800    Standing Orthostatic Pulse (bpm) 98 05/14 0800            Appearance: awake, alert and adequately groomed  Attitude:  cooperative  Eye Contact:  fair  Mood:  sad  and depressed  Affect:  mood congruent  Speech:  dysarthria  Psychomotor Behavior:  no evidence of tardive dyskinesia, dystonia, or tics  Thought Process:  tangental  Associations:  no loose associations  Thought Content:  passive suicidal ideation present and auditory hallucinations present  Insight:  fair  Judgement:  fair  Oriented to:  time, person, and place  Attention Span and Concentration:  fair  Recent and Remote Memory:  fair    Clinical Global Impressions  First:  Considering your total clinical experience with this particular patient population, how severe are the patient's symptoms at this time?: 7 (05/14/18 0512)  Compared to the patient's condition at the START of treatment, this patient's condition is:: 4 (05/14/18 0512)  Most recent:  Considering your total clinical experience with this particular patient population, how severe are the patient's symptoms at this time?: 7 (05/14/18 0512)  Compared to the patient's condition at the START of treatment, this patient's condition is:: 4 (05/14/18 0512)                   Precautions:     Behavioral Orders   Procedures     Code 1 - Restrict " to Unit     Routine Programming     As clinically indicated     Status 15     Every 15 minutes.          Diagnoses:     Schizoaffective disorder, unspecified type (H)  Borderline Intellectual Disability          Plan:     1) Continue decreased Geodon.   2) Continue increased Abilify.   3) Continue Lexapro, Ativan and Trazodone.       Disposition Plan   Reason for ongoing admission: poses an imminent risk to self and is unable to care for self due to severe psychosis or angelito  Discharge location: [facility]  Discharge Medications: not ordered  Follow-up Appointments: not scheduled  Legal Status: 72 hour hold  Entered by: Ronnie Kaba on 5/14/2018 at 11:19 AM

## 2018-05-14 NOTE — PROGRESS NOTES
"   05/14/18 1300   General Information   Date Initially Attended OT 05/14/18   Clinical Impression   Affect Flat   Orientation Oriented to person, place and time   Appearance and ADLs Disheveled;Needs physical assistance;Needs further assessment   Attention to Internal Stimuli No observed signs   Interaction Skills Interacts appropriately with staff;Interacts appropriately with peers   Ability to Communicate Needs Does so with prompts   Verbal Content Clear;Appropriate to topic   Ability to Maintain Boundaries Maintains appropriate physical boundaries;Maintains appropriate verbal boundaries   Participation Independently participates   Concentration Concentrates 50 minutes   Ability to Concentrate With structure;Needs further assessment   Follows and Comprehends Directions Independently follows 1 step verbal directions   Memory Needs further assessment;Delayed and immediate recall intact   Organization Independently organizes simple tasks;Needs occasional assistance    Decision Making Follows the leads of peers   Planning and Problem Solving Needs assistance   Ability to Apply and Learn Concepts Applies within group structure   Frustrations / Stress Tolerance Utilizes coping skills with assistance   Level of Insight Some insight   Self Esteem Can identify positives   Social Supports Has knowledge of support systems   General Observation/Plan   General Observations/Plan See Comments   Attended 2 of 2 OT groups. She was social on approach and elaborated on answers that were in context. She worked at a constant pace with some seeming difficulty using problem solving skills with visuospatial concepts. She participated in identifying multiple helpful calming ideas using the 5 senses. Answers were elaborated on. She stated reason for admission as \"I really don't know. 'm here because I hear voices.\" Changes she hopes for at time of d/c was \"to be able to get out of my apartment to see people.\" she chose the goal focus to " improve concentration, time management, grooming and ask for others when needed. She was independent in all opportunities and seemed to feel this important. Plan: Provide opportunities for creativity and success oriented outlets with graded complexity as needed for success.  Provide opportunity to explore identifiers of stress and expand coping strategies. Assess further and document.

## 2018-05-15 VITALS
OXYGEN SATURATION: 93 % | BODY MASS INDEX: 31.63 KG/M2 | TEMPERATURE: 98.3 F | DIASTOLIC BLOOD PRESSURE: 48 MMHG | HEART RATE: 96 BPM | WEIGHT: 161.1 LBS | HEIGHT: 60 IN | RESPIRATION RATE: 16 BRPM | SYSTOLIC BLOOD PRESSURE: 96 MMHG

## 2018-05-15 PROCEDURE — 99232 SBSQ HOSP IP/OBS MODERATE 35: CPT | Performed by: PSYCHIATRY & NEUROLOGY

## 2018-05-15 PROCEDURE — 94640 AIRWAY INHALATION TREATMENT: CPT | Mod: 76

## 2018-05-15 PROCEDURE — 25000132 ZZH RX MED GY IP 250 OP 250 PS 637: Performed by: PSYCHIATRY & NEUROLOGY

## 2018-05-15 PROCEDURE — 97150 GROUP THERAPEUTIC PROCEDURES: CPT | Mod: GO

## 2018-05-15 PROCEDURE — H2032 ACTIVITY THERAPY, PER 15 MIN: HCPCS

## 2018-05-15 PROCEDURE — 25000132 ZZH RX MED GY IP 250 OP 250 PS 637: Performed by: PHYSICIAN ASSISTANT

## 2018-05-15 PROCEDURE — 12400007 ZZH R&B MH INTERMEDIATE UMMC

## 2018-05-15 PROCEDURE — 25000132 ZZH RX MED GY IP 250 OP 250 PS 637: Performed by: EMERGENCY MEDICINE

## 2018-05-15 PROCEDURE — 25000125 ZZHC RX 250: Performed by: EMERGENCY MEDICINE

## 2018-05-15 PROCEDURE — 40000275 ZZH STATISTIC RCP TIME EA 10 MIN

## 2018-05-15 PROCEDURE — 94640 AIRWAY INHALATION TREATMENT: CPT

## 2018-05-15 RX ADMIN — ARIPIPRAZOLE 10 MG: 5 TABLET ORAL at 08:46

## 2018-05-15 RX ADMIN — ZIPRASIDONE HCL 60 MG: 60 CAPSULE ORAL at 18:02

## 2018-05-15 RX ADMIN — AZELASTINE HYDROCHLORIDE 1 SPRAY: 137 SPRAY, METERED NASAL at 19:21

## 2018-05-15 RX ADMIN — LORAZEPAM 0.5 MG: 0.5 TABLET ORAL at 08:46

## 2018-05-15 RX ADMIN — CALCIUM CARBONATE (ANTACID) CHEW TAB 500 MG 500 MG: 500 CHEW TAB at 10:05

## 2018-05-15 RX ADMIN — IPRATROPIUM BROMIDE AND ALBUTEROL SULFATE 3 ML: .5; 3 SOLUTION RESPIRATORY (INHALATION) at 09:08

## 2018-05-15 RX ADMIN — BUDESONIDE 0.5 MG: 0.5 INHALANT RESPIRATORY (INHALATION) at 21:14

## 2018-05-15 RX ADMIN — DEXTRAN 70 AND HYPROMELLOSE 2910 2 DROP: 1; 3 SOLUTION/ DROPS OPHTHALMIC at 16:00

## 2018-05-15 RX ADMIN — POLYETHYLENE GLYCOL 3350 17 G: 17 POWDER, FOR SOLUTION ORAL at 08:46

## 2018-05-15 RX ADMIN — TRAZODONE HYDROCHLORIDE 100 MG: 100 TABLET ORAL at 21:00

## 2018-05-15 RX ADMIN — AZELASTINE HYDROCHLORIDE 1 SPRAY: 137 SPRAY, METERED NASAL at 08:47

## 2018-05-15 RX ADMIN — SENNOSIDES AND DOCUSATE SODIUM 1 TABLET: 8.6; 5 TABLET ORAL at 08:46

## 2018-05-15 RX ADMIN — BUDESONIDE 0.5 MG: 0.5 INHALANT RESPIRATORY (INHALATION) at 09:08

## 2018-05-15 RX ADMIN — DEXTRAN 70 AND HYPROMELLOSE 2910 2 DROP: 1; 3 SOLUTION/ DROPS OPHTHALMIC at 21:00

## 2018-05-15 RX ADMIN — CETIRIZINE HYDROCHLORIDE 10 MG: 10 TABLET, FILM COATED ORAL at 08:46

## 2018-05-15 RX ADMIN — ZIPRASIDONE HCL 40 MG: 40 CAPSULE ORAL at 21:00

## 2018-05-15 RX ADMIN — SENNOSIDES AND DOCUSATE SODIUM 1 TABLET: 8.6; 5 TABLET ORAL at 19:21

## 2018-05-15 RX ADMIN — ESCITALOPRAM OXALATE 30 MG: 20 TABLET ORAL at 08:46

## 2018-05-15 RX ADMIN — LEVOTHYROXINE SODIUM 150 MCG: 75 TABLET ORAL at 08:46

## 2018-05-15 RX ADMIN — CALCIUM CARBONATE (ANTACID) CHEW TAB 500 MG 500 MG: 500 CHEW TAB at 19:21

## 2018-05-15 RX ADMIN — LORAZEPAM 0.5 MG: 0.5 TABLET ORAL at 21:00

## 2018-05-15 RX ADMIN — DEXTRAN 70 AND HYPROMELLOSE 2910 2 DROP: 1; 3 SOLUTION/ DROPS OPHTHALMIC at 03:50

## 2018-05-15 RX ADMIN — ZIPRASIDONE HCL 60 MG: 60 CAPSULE ORAL at 08:46

## 2018-05-15 RX ADMIN — IPRATROPIUM BROMIDE AND ALBUTEROL SULFATE 3 ML: .5; 3 SOLUTION RESPIRATORY (INHALATION) at 21:14

## 2018-05-15 RX ADMIN — FLUTICASONE PROPIONATE 1 SPRAY: 50 SPRAY, METERED NASAL at 08:47

## 2018-05-15 RX ADMIN — IPRATROPIUM BROMIDE AND ALBUTEROL SULFATE 3 ML: .5; 3 SOLUTION RESPIRATORY (INHALATION) at 14:18

## 2018-05-15 RX ADMIN — DEXTRAN 70 AND HYPROMELLOSE 2910 2 DROP: 1; 3 SOLUTION/ DROPS OPHTHALMIC at 08:47

## 2018-05-15 ASSESSMENT — ACTIVITIES OF DAILY LIVING (ADL)
ORAL_HYGIENE: INDEPENDENT
LAUNDRY: UNABLE TO COMPLETE
ORAL_HYGIENE: INDEPENDENT
GROOMING: INDEPENDENT
DRESS: INDEPENDENT
DRESS: INDEPENDENT
LAUNDRY: UNABLE TO COMPLETE
GROOMING: INDEPENDENT

## 2018-05-15 NOTE — DISCHARGE INSTRUCTIONS
Behavioral Discharge Planning and Instructions      Summary:  You were admitted on 5/11/2018  due to Schizoaffective disorder.  You were treated by Dr. Ronnie Cristina MD and discharged on 05/16/2018 from [address] to Assisted Living[place] [address]  [phone]      Principal Diagnosis:   Schizoaffective Disorder    Health Care Follow-up Appointments:   Psychiatry:   Dr. Mark Watkins MD, -  Tuesday, May 29th at 3:20 pm  [facility], [place],   [phone]    PCP:   [address] [place] [address]  [address],   [phone], Fax: [phone]    :    Jewels Pennington,   [phone],   Fax: [phone]    Guardian:  Marie Roberson  [facility]   ([phone])    Attend all scheduled appointments with your outpatient providers. Call at least 24 hours in advance if you need to reschedule an appointment to ensure continued access to your outpatient providers.   Major Treatments, Procedures and Findings:  You were provided with: a psychiatric assessment, assessed for medical stability and medication evaluation and/or management    Symptoms to Report: feeling more aggressive, increased confusion, losing more sleep, mood getting worse or thoughts of suicide    Early warning signs can include: increased depression or anxiety sleep disturbances increased thoughts or behaviors of suicide or self-harm  increased unusual thinking, such as paranoia or hearing voices    Safety and Wellness:  Take all medicines as directed.  Make no changes unless your doctor suggests them.      Follow treatment recommendations.  Refrain from alcohol and non-prescribed drugs.  If there is a concern for safety, call 911.    Resources:   Crisis Intervention: 428.936.8191 or 937-270-7903 (TTY: 552.254.9306).  Call anytime for help.  National [place] on Mental Illness (www.[place].org): 985.832.6355 or 300-541-1075.  Suicide Awareness Voices of  Education (SAVE) (www.save.org): 888-511-SAVE (7283)  Mental Health Consumer/Survivor Network of [place] (www.mhcsn.net): 891.796.4640 or 880-084-8637  Mental Health Association [place] (www.mentalhealth.org): 427.174.2777 or 212-373-9180  Self- Management and Recovery Training., SMART-- Toll free: [phone]  www.modu.[employer]  [facility] Crisis (COPE) Response - Adult [phone]    The treatment team has appreciated the opportunity to work with you.     If you have any questions or concerns our unit number is [phone].

## 2018-05-15 NOTE — PROGRESS NOTES
Pt participated in dance/movement therapy (DMT) becoming physically expressive and enlivened with rhythmic music.  Pt was smiling and encouraging toward others.

## 2018-05-15 NOTE — PLAN OF CARE
Problem: Behavioral Disturbance  Goal: Behavioral Disturbance  Signs and symptoms of listed problems will be absent or manageable by discharge or transition of care.    Pt will take 100% of scheduled medications.  Pt will report a decrease in visual and auditory hallucinations.  Pt will participate in daily groups.    Outcome: No Change  Pt had an uneventful shift. She attended psycho education groups today and participated in milieu therapy. Full affect, denies SI/SIB. States her mood has continued to improve since admission, but that her anxiety is still high. She stated she would ask for a prn if she needed one, but is hopeful that attending groups will be sufficient distraction for her to manage it. She denied AH today, but stated she was unsure if she saw a nurse enter her room this morning with eye drops.     Assessment of pt's progress toward meeting careplan goals:  no change

## 2018-05-15 NOTE — PROGRESS NOTES
Pt will discharge tomorrow.  Any medications should be faxed to [facility] at [phone].  AVS should be faxed to [facility] at [phone]. Left voice messages with pt's guardian (who is out of town until Thursday) and pt's  to inform that pt will be discharging tomorrow. Transportation scheduled for 1pm.

## 2018-05-15 NOTE — PROGRESS NOTES
"Pleasant , animated during interaction. Early evening denied voices, later said she was having hallucinations. Not specific about what the voices were saying. Denies depression, denies anxiety. At first said she did not know why they brought her to the hospital, and then when she thought about it a bit \"they said I was having hallucinations..\"  "

## 2018-05-15 NOTE — PROGRESS NOTES
"[facility], [place]   Psychiatric Progress Note        Interim History:   The patient's care was discussed with the treatment team during the daily team meeting and/or staff's chart notes were reviewed.  Staff report patient has been denying SI or SIB. Denies AH as well.     The patient reports that she is \"frustrated\" by her hearing loss. Has been present since birth. Sleeping well. Eating some. Denies SI. Some AH but no commands. Did feel that nurse came in overnight and gave her eye drops and isn't sure if she is supposed to have cataract surgery. Feels safe at her facility.          Medications:       ARIPiprazole  10 mg Oral Daily     azelastine  1 spray Both Nostrils BID     budesonide  0.5 mg Nebulization BID     calcium carbonate  1 tablet Oral BID     cetirizine  10 mg Oral Daily     escitalopram  30 mg Oral Daily     fluticasone  1 spray Both Nostrils Daily     hypromellose-dextran  2 drop Both Eyes Q6H     ipratropium - albuterol 0.5 mg/2.5 mg/3 mL  1 vial Nebulization TID     levothyroxine  150 mcg Oral Daily     LORazepam  0.5 mg Oral BID     polyethylene glycol  17 g Oral Daily     senna-docusate  1 tablet Oral BID     traZODone  100 mg Oral At Bedtime     ziprasidone  40 mg Oral QPM     ziprasidone  60 mg Oral BID w/meals          Allergies:     Allergies   Allergen Reactions     Aspirin      nausea     Erythromycin      nausea          Labs:     No results found for this or any previous visit (from the past 24 hour(s)).       Psychiatric Examination:     BP 96/48  Pulse 96  Temp 98.3  F (36.8  C) (Tympanic)  Resp 16  Ht 1.511 m (4' 11.5\")  Wt 73.1 kg (161 lb 1.6 oz)  SpO2 93%  BMI 31.99 kg/m2  Weight is 161 lbs 1.6 oz  Body mass index is 31.99 kg/(m^2).  Orthostatic Vitals       Most Recent      Sitting Orthostatic /48 05/14 0800    Sitting Orthostatic Pulse (bpm) 91 05/14 0800    Standing Orthostatic /61 05/14 0800    Standing Orthostatic Pulse (bpm) " 98 05/14 0800            Appearance: awake, alert and adequately groomed  Attitude:  cooperative  Eye Contact:  fair  Mood:  frustrated  Affect:  mood congruent  Speech:  dysarthria  Psychomotor Behavior:  no evidence of tardive dyskinesia, dystonia, or tics  Thought Process:  tangental  Associations:  no loose associations  Thought Content:  no evidence of suicidal ideation or homicidal ideation and auditory hallucinations present  Insight:  fair  Judgement:  fair  Oriented to:  time, person, and place  Attention Span and Concentration:  fair  Recent and Remote Memory:  fair    Clinical Global Impressions  First:  Considering your total clinical experience with this particular patient population, how severe are the patient's symptoms at this time?: 7 (05/14/18 0512)  Compared to the patient's condition at the START of treatment, this patient's condition is:: 4 (05/14/18 0512)  Most recent:  Considering your total clinical experience with this particular patient population, how severe are the patient's symptoms at this time?: 7 (05/14/18 0512)  Compared to the patient's condition at the START of treatment, this patient's condition is:: 4 (05/14/18 0512)                   Precautions:     Behavioral Orders   Procedures     Code 1 - Restrict to Unit     Routine Programming     As clinically indicated     Status 15     Every 15 minutes.          Diagnoses:     Schizoaffective disorder, unspecified type (H)  Borderline Intellectual Disability          Plan:     1) Continue decreased Geodon.   2) Continue increased Abilify.   3) Continue Lexapro, Ativan and Trazodone.       Disposition Plan   Reason for ongoing admission: poses an imminent risk to self and is unable to care for self due to severe psychosis or angelito  Discharge location: [facility]  Discharge Medications: not ordered  Follow-up Appointments: not scheduled  Legal Status: voluntary (signed by guardian).   Entered by: Ronnie Kaba on 5/14/2018 at 8:06 AM

## 2018-05-15 NOTE — PLAN OF CARE
"Problem: Behavioral Disturbance  Goal: Behavioral Disturbance  Signs and symptoms of listed problems will be absent or manageable by discharge or transition of care.    Pt will take 100% of scheduled medications.  Pt will report a decrease in visual and auditory hallucinations.  Pt will participate in daily groups.      Attended 1 of 2 OT groups, declining the 2nd attendance with feeling \"too tired\". She was pleasant, quick to participate with attempting to problem solve using visuospatial concepts. She declined assistance, she persisted with the process and accomplished a small amount of identifying solutions. She appears calm, comfortable with others and in participation.       "

## 2018-05-16 PROCEDURE — 94640 AIRWAY INHALATION TREATMENT: CPT

## 2018-05-16 PROCEDURE — 97150 GROUP THERAPEUTIC PROCEDURES: CPT | Mod: GO

## 2018-05-16 PROCEDURE — 25000132 ZZH RX MED GY IP 250 OP 250 PS 637: Performed by: PSYCHIATRY & NEUROLOGY

## 2018-05-16 PROCEDURE — 40000275 ZZH STATISTIC RCP TIME EA 10 MIN

## 2018-05-16 PROCEDURE — 99238 HOSP IP/OBS DSCHRG MGMT 30/<: CPT | Performed by: PSYCHIATRY & NEUROLOGY

## 2018-05-16 PROCEDURE — 25000132 ZZH RX MED GY IP 250 OP 250 PS 637: Performed by: EMERGENCY MEDICINE

## 2018-05-16 PROCEDURE — 25000132 ZZH RX MED GY IP 250 OP 250 PS 637: Performed by: PHYSICIAN ASSISTANT

## 2018-05-16 PROCEDURE — 25000125 ZZHC RX 250: Performed by: EMERGENCY MEDICINE

## 2018-05-16 RX ORDER — AMOXICILLIN 250 MG
1 CAPSULE ORAL 2 TIMES DAILY
Qty: 100 TABLET | COMMUNITY
Start: 2018-05-16

## 2018-05-16 RX ORDER — ZIPRASIDONE HYDROCHLORIDE 40 MG/1
40 CAPSULE ORAL EVERY EVENING
Qty: 30 CAPSULE | Refills: 1 | Status: SHIPPED | OUTPATIENT
Start: 2018-05-16

## 2018-05-16 RX ORDER — ARIPIPRAZOLE 10 MG/1
10 TABLET ORAL DAILY
Qty: 30 TABLET | Refills: 1 | Status: SHIPPED | OUTPATIENT
Start: 2018-05-16

## 2018-05-16 RX ADMIN — DEXTRAN 70 AND HYPROMELLOSE 2910 2 DROP: 1; 3 SOLUTION/ DROPS OPHTHALMIC at 08:41

## 2018-05-16 RX ADMIN — AZELASTINE HYDROCHLORIDE 1 SPRAY: 137 SPRAY, METERED NASAL at 08:40

## 2018-05-16 RX ADMIN — BUDESONIDE 0.5 MG: 0.5 INHALANT RESPIRATORY (INHALATION) at 09:24

## 2018-05-16 RX ADMIN — IPRATROPIUM BROMIDE AND ALBUTEROL SULFATE 3 ML: .5; 3 SOLUTION RESPIRATORY (INHALATION) at 09:25

## 2018-05-16 RX ADMIN — CALCIUM CARBONATE (ANTACID) CHEW TAB 500 MG 500 MG: 500 CHEW TAB at 08:34

## 2018-05-16 RX ADMIN — DEXTRAN 70 AND HYPROMELLOSE 2910 2 DROP: 1; 3 SOLUTION/ DROPS OPHTHALMIC at 02:19

## 2018-05-16 RX ADMIN — LEVOTHYROXINE SODIUM 150 MCG: 75 TABLET ORAL at 08:34

## 2018-05-16 RX ADMIN — ESCITALOPRAM OXALATE 30 MG: 20 TABLET ORAL at 08:35

## 2018-05-16 RX ADMIN — ARIPIPRAZOLE 10 MG: 5 TABLET ORAL at 08:28

## 2018-05-16 RX ADMIN — POLYETHYLENE GLYCOL 3350 17 G: 17 POWDER, FOR SOLUTION ORAL at 08:36

## 2018-05-16 RX ADMIN — LORAZEPAM 0.5 MG: 0.5 TABLET ORAL at 08:35

## 2018-05-16 RX ADMIN — ZIPRASIDONE HCL 60 MG: 60 CAPSULE ORAL at 08:35

## 2018-05-16 RX ADMIN — FLUTICASONE PROPIONATE 1 SPRAY: 50 SPRAY, METERED NASAL at 08:39

## 2018-05-16 RX ADMIN — SENNOSIDES AND DOCUSATE SODIUM 1 TABLET: 8.6; 5 TABLET ORAL at 08:35

## 2018-05-16 RX ADMIN — CETIRIZINE HYDROCHLORIDE 10 MG: 10 TABLET, FILM COATED ORAL at 08:35

## 2018-05-16 ASSESSMENT — ACTIVITIES OF DAILY LIVING (ADL)
ORAL_HYGIENE: INDEPENDENT
GROOMING: INDEPENDENT
DRESS: INDEPENDENT

## 2018-05-16 NOTE — PROGRESS NOTES
Occupational Therapy Note    Ekaterina participated in OT group designed to promote self-esteem in treatment while facilitating cognitive skills and application of coping. Ekaterina was observed by this writer during the end of community meeting. She was tangential in her answers and rambled, presenting disorganized information. In group, Ekaterina was able to recall her project from previous day and was set up with her supplies. She worked quietly with focus and concentration for duration of group. She required cueing as to the end of group.

## 2018-05-16 NOTE — PLAN OF CARE
Problem: Behavioral Disturbance  Goal: Behavioral Disturbance  Signs and symptoms of listed problems will be absent or manageable by discharge or transition of care.    Pt will take 100% of scheduled medications.  Pt will report a decrease in visual and auditory hallucinations.  Pt will participate in daily groups.    Outcome: Therapy, progress toward functional goals as expected  Still has auditory hallucinations. Denies depression. Spends free time in lounge, enjoys watching tv. Using pocket talker to communicate. Med compliant.

## 2018-05-16 NOTE — DISCHARGE SUMMARY
Psychiatric Discharge Summary    Ekaterina Waldrop MRN# [id number]   Age: 68 year old YOB: 1950     Date of Admission:  5/11/2018  Date of Discharge:  5/16/2018  1:16 PM  Admitting Physician:  Neo Fish MD  Discharge Physician:  Ronnie Kaba MD          Event Leading to Hospitalization:   Ekaterina Waldrop is a 68 year old female with history of Schizoaffective Disorder who was admitted on 72 hour hold for worsening psychotic symptoms in the community. Reports indicate that the patient was sent in by her outpatient psychiatrist, Dr. Mark Vasquez, after endorsing worsening AH, which include voices that have been derogatory (calling her worthless) along with voices that are threatening to kill her and her friend. When asked about this during interview today, she does endorse hearing voices, that are distressing and scary, but is somewhat confused as to why she's in the hospital. She denies any suicidal/homicidal ideation, intent or plan. She mentions that is is unsure how much her medications are helping her, and is unclear the exact names or doses she's taking. She denies any tremor, muscle stiffness, but does have some dizziness/lightheadness, and then goes on to talk about feeling like she has a sore throat, and ear pain. When asked about how things where going at her assisted living facility (where she's been since 2009), she mentioned that she liked it there, and had good support. She gets help with bathing, and basing daily routine. She mentions that she has been isolating herself in her room because she believed she had a sore throat, and didn't want to give it to anyone else. Her goal is to get her sore throat looked at and her voices to be toned down. She is open to medication changes. Of note, the patient has a legal guardian, Marie Ojeda, who was attempted to be reached multiple times today, but could not be (farida was left). The patient feels safe in the hospital, is seen  in the milieu, and plans to attend groups. Of note, she was alert, and fully oriented to person, place and time.        See Admission note for additional details.          DIagnoses:     Schizoaffective disorder, unspecified type (H)  Borderline Intellectual Disability          Labs:          Lab Results   Component Value Date     05/11/2018    Lab Results   Component Value Date    CHLORIDE 104 05/11/2018    Lab Results   Component Value Date    BUN 21 05/11/2018      Lab Results   Component Value Date    POTASSIUM 4.1 05/11/2018    Lab Results   Component Value Date    CO2 28 05/11/2018    Lab Results   Component Value Date    CR 0.64 05/11/2018        Lab Results   Component Value Date    WBC 8.9 05/11/2018    HGB 12.8 05/11/2018    HCT 38.1 05/11/2018    MCV 90 05/11/2018     05/11/2018     No results found for: AST, ALT, GGT, ALKPHOS, BILITOTAL, BILICONJ, BILIDIRECT, ANICETO  Lab Results   Component Value Date    TSH 1.37 05/11/2018            Consults:   Consultation during this admission received from internal medicine:    Ekaterina Waldrop is a 68 year old female w/ a pmh of mild intellectual disability, schizoaffective disorder, advanced kyphosis, osteoporosis, GERD, seasonal allergies, and hypothyroidism who was admitted to [facility] station 3B due to worsening psychotic symptoms.      # Decompensated schizoaffective disorder.  Management per primary team, psychiatry.      Likely viral URI w/ conjunctivitis vs. Seasonal allergies. Unclear duration of symptoms or triggers. CXR normal 05/11. WBC count normal and diff normal, remains afebrile. Rapid strep negative and culture w/ NGTD.  -Warm compresses to right eye TID  -Continue home zyrtec  -Agree with continuing home Flonase  -Continue artificial tears Q6 hours  -Continue sore throat lozenges PRN  -If worsening may consider mild anti histamine drops, but will trial above therapies first, notify medicine if worsening symptoms      Chronic cough.  Evaluated by pulmonary, Unable to complete PFTs. CXR clear on admission. Sating well on RA.  -Continue scheduled nebs pulmicort BID, duonebs TID and inhalers  -F/U with pulmonary PRN      Hypothyroidism. Continue home synthroid at 150 mcg qday. TSH normal on admission.   Osteoporosis, kyphosis. Continue walker. Continue fosamax, calcium and vitamin D supplements.  GERD. Continue tums, maalox PRN.  Constipation. Continue home miralax and senna.          Hospital Course:   Ekaterina Waldrop was admitted to Station 3[address] with attending Ronnie Kaba MD on a 72 hour mental health hold, but patient subsequently signed in voluntarily. The patient was placed under status 15 (15 minute checks) to ensure patient safety.   CBC, BMP and utox obtained.    All outpatient medications were continued. Geodon was decreased to a total dose of 160mg Qday. Abilify was increased to 10mg Qday. EKG was normal.     Ekaterina Waldrop did participate in groups and was visible in the milieu.     The patient's symptoms of psychosis improved.     # Discharge Pain Plan:   - Patient currently has NO PAIN and is not being prescribed pain medications on discharge.      Ekaterina Waldrop was released to skilled nursing facility. At the time of discharge Ekaterina Waldrop was determined to not be a danger to herself or others. At the current time of discharge, the patient does not meet criteria for involuntary hospitalization. On the day of discharge, the patient reports that they do not have suicidal or homicidal ideation and would never hurt themselves or others. Steps taken to minimize risk include: assessing patient s behavior and thought process daily during hospital stay, discharging patient with adequate plan for follow up for mental and physical health and discussing safety plan of returning to the hospital should the patient ever have thoughts of harming themselves or others. Therefore, based on all available evidence including  the factors cited above, the patient does not appear to be at imminent risk for self-harm, and is appropriate for outpatient level of care.            Discharge Medications:     Current Discharge Medication List      CONTINUE these medications which have CHANGED    Details   ARIPiprazole (ABILIFY) 10 MG tablet Take 1 tablet (10 mg) by mouth daily  Qty: 30 tablet, Refills: 1    Associated Diagnoses: Schizoaffective disorder, unspecified type (H)      senna-docusate (SENOKOT-S;PERICOLACE) 8.6-50 MG per tablet Take 1 tablet by mouth 2 times daily  Qty: 100 tablet      !! ziprasidone (GEODON) 40 MG capsule Take 1 capsule (40 mg) by mouth every evening  Qty: 30 capsule, Refills: 1    Associated Diagnoses: Schizoaffective disorder, unspecified type (H)       !! - Potential duplicate medications found. Please discuss with provider.      CONTINUE these medications which have NOT CHANGED    Details   acetaminophen (TYLENOL) 325 MG tablet Take 650 mg by mouth 3 times daily and once daily as needed      albuterol (PROAIR HFA/PROVENTIL HFA/VENTOLIN HFA) 108 (90 Base) MCG/ACT Inhaler Inhale 2 puffs into the lungs every 6 hours as needed for shortness of breath / dyspnea or wheezing      alendronate (FOSAMAX) 70 MG tablet Take 70 mg by mouth every 7 days      azelastine (ASTELIN) 0.1 % spray Spray 1 spray into both nostrils 2 times daily      bismuth subsalicylate 525 MG/15ML SUSP Take 15 mLs by mouth 3 times daily as needed      budesonide (PULMICORT) 0.5 MG/2ML neb solution Take 0.5 mg by nebulization 2 times daily      calcium carbonate 500 MG CHEW Take 1 tablet by mouth 2 times daily      calcium-vitamin D (CALCIUM 600 + D) 600-400 MG-UNIT per tablet Take 1 tablet by mouth 3 times daily      cetirizine (ZYRTEC) 10 MG tablet Take 10 mg by mouth daily      cholecalciferol 1000 units TABS Take 1,000 Units by mouth daily      escitalopram (LEXAPRO) 20 MG tablet Take 30 mg by mouth daily      fluticasone (FLONASE) 50 MCG/ACT spray  Spray 1 spray into both nostrils daily      hypromellose-dextran (ARTIFICAL TEARS) 0.1-0.3 % SOLN ophthalmic solution Place 1 drop into both eyes 2 times daily      ipratropium - albuterol 0.5 mg/2.5 mg/3 mL (DUONEB) 0.5-2.5 (3) MG/3ML neb solution Take 1 vial by nebulization 3 times daily      levothyroxine (SYNTHROID/LEVOTHROID) 150 MCG tablet Take 150 mcg by mouth daily      LORazepam (ATIVAN) 0.5 MG tablet Take 0.5 mg by mouth 2 times daily      Multiple Vitamins-Minerals (MULTIVITAMIN ADULT PO) Take 1 tablet by mouth daily      ondansetron (ZOFRAN) 4 MG tablet Take 4 mg by mouth every 8 hours as needed for nausea      POLYETHYLENE GLYCOL 3350 PO Take 1 packet by mouth daily      sodium fluoride dental gel (PREVIDENT) 1.1 % GEL topical gel Apply to affected area daily      traZODone (DESYREL) 100 MG tablet Take 100 mg by mouth At Bedtime      !! ziprasidone (GEODON) 60 MG capsule Take 60 mg by mouth 2 times daily (with meals)       !! - Potential duplicate medications found. Please discuss with provider.      STOP taking these medications       clindamycin (CLEOCIN) 300 MG capsule Comments:   Reason for Stopping:                    Psychiatric Examination:   Appearance:  awake, alert and adequately groomed  Attitude:  cooperative  Eye Contact:  fair  Mood:  better  Affect:  mood congruent  Speech:  dysarthria  Psychomotor Behavior:  no evidence of tardive dyskinesia, dystonia, or tics  Thought Process:  goal oriented  Associations:  no loose associations  Thought Content:  no evidence of suicidal ideation or homicidal ideation, auditory hallucinations present and manageable  Insight:  fair  Judgment:  fair  Oriented to:  time, person, and place  Attention Span and Concentration:  fair  Recent and Remote Memory:  fair  Language: Able to read and write  Fund of Knowledge: low-normal  Muscle Strength and Tone: normal  Gait and Station: with walker         Discharge Plan:   Continue medications as above.     Health  Care Follow-up Appointments:   Psychiatry:   Dr. Mark Watkins MD, -  Tuesday, May 29th at 3:20 pm  [facility],   [phone]     PCP:   [place]  [address],   [phone], Fax: [phone]     :    [facility] Pennington,   [phone],   Fax: [phone]     Guardian:  Marie Roberson  [facility]   ([phone])     Attend all scheduled appointments with your outpatient providers. Call at least 24 hours in advance if you need to reschedule an appointment to ensure continued access to your outpatient providers.   Major Treatments, Procedures and Findings:  You were provided with: a psychiatric assessment, assessed for medical stability and medication evaluation and/or management     Symptoms to Report: feeling more aggressive, increased confusion, losing more sleep, mood getting worse or thoughts of suicide     Early warning signs can include: increased depression or anxiety sleep disturbances increased thoughts or behaviors of suicide or self-harm  increased unusual thinking, such as paranoia or hearing voices     Safety and Wellness:  Take all medicines as directed.  Make no changes unless your doctor suggests them.      Follow treatment recommendations.  Refrain from alcohol and non-prescribed drugs.  If there is a concern for safety, call 911.     Resources:   Crisis Intervention: 125.543.8011 or 308-832-2759 (TTY: 622.633.7325).  Call anytime for help.  National [place] on Mental Illness (www.[place].org): 824.312.6401 or 965-421-7136.  Suicide Awareness Voices of Education (SAVE) (www.save.org): 875-799-SAVE (6347)  Mental Health Consumer/Survivor Network of [place] (www.mhcsn.net): 212.871.6263 or 085-105-0463  Mental Health Association of [place] (www.mentalhealth.org): 200.575.7431 or 413-993-7404  Self- Management and Recovery Training., SMART-- Toll free: 652.387.9506  [email or web address]  [facility] (COPE)  Response - Adult [phone]    Attestation:  The patient was seen and evaluated by me. I spent less than 30 minutes on discharge day activities. Ronnie Kaba MD